# Patient Record
Sex: MALE | Race: WHITE | Employment: FULL TIME | URBAN - METROPOLITAN AREA
[De-identification: names, ages, dates, MRNs, and addresses within clinical notes are randomized per-mention and may not be internally consistent; named-entity substitution may affect disease eponyms.]

---

## 2017-12-01 PROBLEM — R35.1 NOCTURIA: Status: ACTIVE | Noted: 2017-12-01

## 2020-11-30 ENCOUNTER — HOSPITAL ENCOUNTER (OUTPATIENT)
Dept: PREADMISSION TESTING | Age: 85
Discharge: HOME | End: 2020-11-30
Payer: COMMERCIAL

## 2020-11-30 ENCOUNTER — TRANSCRIBE ORDER (OUTPATIENT)
Dept: REGISTRATION | Age: 85
End: 2020-11-30

## 2020-11-30 DIAGNOSIS — D49.4 BLADDER NEOPLASM: ICD-10-CM

## 2020-11-30 DIAGNOSIS — D49.4 BLADDER NEOPLASM: Primary | ICD-10-CM

## 2020-11-30 LAB
ANION GAP SERPL CALC-SCNC: 5 MMOL/L (ref 3–18)
BASOPHILS # BLD: 0 K/UL (ref 0–0.1)
BASOPHILS NFR BLD: 0 % (ref 0–2)
BUN SERPL-MCNC: 17 MG/DL (ref 7–18)
BUN/CREAT SERPL: 13 (ref 12–20)
CALCIUM SERPL-MCNC: 9.7 MG/DL (ref 8.5–10.1)
CHLORIDE SERPL-SCNC: 103 MMOL/L (ref 100–111)
CO2 SERPL-SCNC: 30 MMOL/L (ref 21–32)
CREAT SERPL-MCNC: 1.32 MG/DL (ref 0.6–1.3)
DIFFERENTIAL METHOD BLD: ABNORMAL
EOSINOPHIL # BLD: 0.1 K/UL (ref 0–0.4)
EOSINOPHIL NFR BLD: 1 % (ref 0–5)
ERYTHROCYTE [DISTWIDTH] IN BLOOD BY AUTOMATED COUNT: 13 % (ref 11.6–14.5)
GLUCOSE SERPL-MCNC: 93 MG/DL (ref 74–99)
HCT VFR BLD AUTO: 43.6 % (ref 36–48)
HGB BLD-MCNC: 14.7 G/DL (ref 13–16)
LYMPHOCYTES # BLD: 2.2 K/UL (ref 0.9–3.6)
LYMPHOCYTES NFR BLD: 26 % (ref 21–52)
MCH RBC QN AUTO: 33 PG (ref 24–34)
MCHC RBC AUTO-ENTMCNC: 33.7 G/DL (ref 31–37)
MCV RBC AUTO: 98 FL (ref 74–97)
MONOCYTES # BLD: 0.8 K/UL (ref 0.05–1.2)
MONOCYTES NFR BLD: 10 % (ref 3–10)
NEUTS SEG # BLD: 5.3 K/UL (ref 1.8–8)
NEUTS SEG NFR BLD: 63 % (ref 40–73)
PLATELET # BLD AUTO: 192 K/UL (ref 135–420)
PMV BLD AUTO: 10.9 FL (ref 9.2–11.8)
POTASSIUM SERPL-SCNC: 4.5 MMOL/L (ref 3.5–5.5)
RBC # BLD AUTO: 4.45 M/UL (ref 4.7–5.5)
SODIUM SERPL-SCNC: 138 MMOL/L (ref 136–145)
WBC # BLD AUTO: 8.4 K/UL (ref 4.6–13.2)

## 2020-11-30 PROCEDURE — 36415 COLL VENOUS BLD VENIPUNCTURE: CPT

## 2020-11-30 PROCEDURE — 85025 COMPLETE CBC W/AUTO DIFF WBC: CPT

## 2020-11-30 PROCEDURE — 80048 BASIC METABOLIC PNL TOTAL CA: CPT

## 2020-11-30 PROCEDURE — 93005 ELECTROCARDIOGRAM TRACING: CPT

## 2020-12-01 LAB
ATRIAL RATE: 58 BPM
CALCULATED P AXIS, ECG09: 42 DEGREES
CALCULATED R AXIS, ECG10: 68 DEGREES
CALCULATED T AXIS, ECG11: 55 DEGREES
DIAGNOSIS, 93000: NORMAL
P-R INTERVAL, ECG05: 192 MS
Q-T INTERVAL, ECG07: 402 MS
QRS DURATION, ECG06: 90 MS
QTC CALCULATION (BEZET), ECG08: 394 MS
VENTRICULAR RATE, ECG03: 58 BPM

## 2020-12-10 ENCOUNTER — HOSPITAL ENCOUNTER (OUTPATIENT)
Dept: PREADMISSION TESTING | Age: 85
Discharge: HOME | End: 2020-12-10
Payer: COMMERCIAL

## 2020-12-10 PROCEDURE — 87635 SARS-COV-2 COVID-19 AMP PRB: CPT

## 2020-12-11 LAB — SARS-COV-2, COV2NT: NOT DETECTED

## 2020-12-15 ENCOUNTER — ANESTHESIA (OUTPATIENT)
Dept: SURGERY | Age: 85
End: 2020-12-15
Payer: COMMERCIAL

## 2020-12-15 ENCOUNTER — ANESTHESIA EVENT (OUTPATIENT)
Dept: SURGERY | Age: 85
End: 2020-12-15
Payer: COMMERCIAL

## 2020-12-15 ENCOUNTER — HOSPITAL ENCOUNTER (OUTPATIENT)
Age: 85
Setting detail: OUTPATIENT SURGERY
Discharge: HOME | End: 2020-12-15
Attending: UROLOGY | Admitting: UROLOGY
Payer: COMMERCIAL

## 2020-12-15 VITALS
SYSTOLIC BLOOD PRESSURE: 134 MMHG | WEIGHT: 200.19 LBS | RESPIRATION RATE: 18 BRPM | TEMPERATURE: 97.3 F | HEART RATE: 61 BPM | OXYGEN SATURATION: 100 % | DIASTOLIC BLOOD PRESSURE: 65 MMHG | HEIGHT: 71 IN | BODY MASS INDEX: 28.02 KG/M2

## 2020-12-15 DIAGNOSIS — N32.89 BLADDER MASS: Primary | ICD-10-CM

## 2020-12-15 PROCEDURE — 77030034696 HC CATH URETH FOL 2W BARD -A: Performed by: UROLOGY

## 2020-12-15 PROCEDURE — 74011250636 HC RX REV CODE- 250/636: Performed by: NURSE ANESTHETIST, CERTIFIED REGISTERED

## 2020-12-15 PROCEDURE — 2709999900 HC NON-CHARGEABLE SUPPLY: Performed by: UROLOGY

## 2020-12-15 PROCEDURE — 76060000032 HC ANESTHESIA 0.5 TO 1 HR: Performed by: UROLOGY

## 2020-12-15 PROCEDURE — 74011000250 HC RX REV CODE- 250: Performed by: UROLOGY

## 2020-12-15 PROCEDURE — 74011250636 HC RX REV CODE- 250/636: Performed by: UROLOGY

## 2020-12-15 PROCEDURE — 77030040361 HC SLV COMPR DVT MDII -B: Performed by: UROLOGY

## 2020-12-15 PROCEDURE — 76210000026 HC REC RM PH II 1 TO 1.5 HR: Performed by: UROLOGY

## 2020-12-15 PROCEDURE — 76210000063 HC OR PH I REC FIRST 0.5 HR: Performed by: UROLOGY

## 2020-12-15 PROCEDURE — 77030008683 HC TU ET CUF COVD -A: Performed by: STUDENT IN AN ORGANIZED HEALTH CARE EDUCATION/TRAINING PROGRAM

## 2020-12-15 PROCEDURE — 74011000250 HC RX REV CODE- 250: Performed by: NURSE ANESTHETIST, CERTIFIED REGISTERED

## 2020-12-15 PROCEDURE — 77030020268 HC MISC GENERAL SUPPLY: Performed by: UROLOGY

## 2020-12-15 PROCEDURE — 88305 TISSUE EXAM BY PATHOLOGIST: CPT

## 2020-12-15 PROCEDURE — 88307 TISSUE EXAM BY PATHOLOGIST: CPT

## 2020-12-15 PROCEDURE — 77030020782 HC GWN BAIR PAWS FLX 3M -B: Performed by: UROLOGY

## 2020-12-15 PROCEDURE — 76010000138 HC OR TIME 0.5 TO 1 HR: Performed by: UROLOGY

## 2020-12-15 PROCEDURE — 77030008477 HC STYL SATN SLP COVD -A: Performed by: STUDENT IN AN ORGANIZED HEALTH CARE EDUCATION/TRAINING PROGRAM

## 2020-12-15 PROCEDURE — 77030013079 HC BLNKT BAIR HGGR 3M -A: Performed by: STUDENT IN AN ORGANIZED HEALTH CARE EDUCATION/TRAINING PROGRAM

## 2020-12-15 RX ORDER — NEOSTIGMINE METHYLSULFATE 1 MG/ML
INJECTION, SOLUTION INTRAVENOUS AS NEEDED
Status: DISCONTINUED | OUTPATIENT
Start: 2020-12-15 | End: 2020-12-15 | Stop reason: HOSPADM

## 2020-12-15 RX ORDER — SODIUM CHLORIDE, SODIUM LACTATE, POTASSIUM CHLORIDE, CALCIUM CHLORIDE 600; 310; 30; 20 MG/100ML; MG/100ML; MG/100ML; MG/100ML
125 INJECTION, SOLUTION INTRAVENOUS CONTINUOUS
Status: DISCONTINUED | OUTPATIENT
Start: 2020-12-15 | End: 2020-12-15 | Stop reason: HOSPADM

## 2020-12-15 RX ORDER — TRAMADOL HYDROCHLORIDE 50 MG/1
50 TABLET ORAL
Qty: 10 TAB | Refills: 0 | Status: SHIPPED | OUTPATIENT
Start: 2020-12-15 | End: 2020-12-18

## 2020-12-15 RX ORDER — NALBUPHINE HYDROCHLORIDE 10 MG/ML
5 INJECTION, SOLUTION INTRAMUSCULAR; INTRAVENOUS; SUBCUTANEOUS
Status: DISCONTINUED | OUTPATIENT
Start: 2020-12-15 | End: 2020-12-15 | Stop reason: HOSPADM

## 2020-12-15 RX ORDER — SODIUM CHLORIDE 0.9 % (FLUSH) 0.9 %
5-40 SYRINGE (ML) INJECTION EVERY 8 HOURS
Status: DISCONTINUED | OUTPATIENT
Start: 2020-12-15 | End: 2020-12-15 | Stop reason: HOSPADM

## 2020-12-15 RX ORDER — SODIUM CHLORIDE, SODIUM LACTATE, POTASSIUM CHLORIDE, CALCIUM CHLORIDE 600; 310; 30; 20 MG/100ML; MG/100ML; MG/100ML; MG/100ML
50 INJECTION, SOLUTION INTRAVENOUS CONTINUOUS
Status: DISCONTINUED | OUTPATIENT
Start: 2020-12-15 | End: 2020-12-15 | Stop reason: HOSPADM

## 2020-12-15 RX ORDER — FENTANYL CITRATE 50 UG/ML
INJECTION, SOLUTION INTRAMUSCULAR; INTRAVENOUS AS NEEDED
Status: DISCONTINUED | OUTPATIENT
Start: 2020-12-15 | End: 2020-12-15 | Stop reason: HOSPADM

## 2020-12-15 RX ORDER — LIDOCAINE HYDROCHLORIDE 20 MG/ML
INJECTION, SOLUTION EPIDURAL; INFILTRATION; INTRACAUDAL; PERINEURAL AS NEEDED
Status: DISCONTINUED | OUTPATIENT
Start: 2020-12-15 | End: 2020-12-15 | Stop reason: HOSPADM

## 2020-12-15 RX ORDER — GLYCOPYRROLATE 0.2 MG/ML
INJECTION INTRAMUSCULAR; INTRAVENOUS AS NEEDED
Status: DISCONTINUED | OUTPATIENT
Start: 2020-12-15 | End: 2020-12-15 | Stop reason: HOSPADM

## 2020-12-15 RX ORDER — PROPOFOL 10 MG/ML
INJECTION, EMULSION INTRAVENOUS AS NEEDED
Status: DISCONTINUED | OUTPATIENT
Start: 2020-12-15 | End: 2020-12-15 | Stop reason: HOSPADM

## 2020-12-15 RX ORDER — NALOXONE HYDROCHLORIDE 0.4 MG/ML
0.04 INJECTION, SOLUTION INTRAMUSCULAR; INTRAVENOUS; SUBCUTANEOUS
Status: DISCONTINUED | OUTPATIENT
Start: 2020-12-15 | End: 2020-12-15 | Stop reason: HOSPADM

## 2020-12-15 RX ORDER — MIDAZOLAM HYDROCHLORIDE 1 MG/ML
INJECTION, SOLUTION INTRAMUSCULAR; INTRAVENOUS AS NEEDED
Status: DISCONTINUED | OUTPATIENT
Start: 2020-12-15 | End: 2020-12-15 | Stop reason: HOSPADM

## 2020-12-15 RX ORDER — HYDROMORPHONE HYDROCHLORIDE 2 MG/ML
0.2 INJECTION, SOLUTION INTRAMUSCULAR; INTRAVENOUS; SUBCUTANEOUS AS NEEDED
Status: DISCONTINUED | OUTPATIENT
Start: 2020-12-15 | End: 2020-12-15 | Stop reason: HOSPADM

## 2020-12-15 RX ORDER — CEPHALEXIN 500 MG/1
500 CAPSULE ORAL 2 TIMES DAILY
Qty: 10 CAP | Refills: 0 | Status: SHIPPED | OUTPATIENT
Start: 2020-12-15 | End: 2020-12-20

## 2020-12-15 RX ORDER — FENTANYL CITRATE 50 UG/ML
25 INJECTION, SOLUTION INTRAMUSCULAR; INTRAVENOUS
Status: DISCONTINUED | OUTPATIENT
Start: 2020-12-15 | End: 2020-12-15 | Stop reason: HOSPADM

## 2020-12-15 RX ORDER — DEXAMETHASONE SODIUM PHOSPHATE 4 MG/ML
INJECTION, SOLUTION INTRA-ARTICULAR; INTRALESIONAL; INTRAMUSCULAR; INTRAVENOUS; SOFT TISSUE AS NEEDED
Status: DISCONTINUED | OUTPATIENT
Start: 2020-12-15 | End: 2020-12-15 | Stop reason: HOSPADM

## 2020-12-15 RX ORDER — ONDANSETRON 2 MG/ML
INJECTION INTRAMUSCULAR; INTRAVENOUS AS NEEDED
Status: DISCONTINUED | OUTPATIENT
Start: 2020-12-15 | End: 2020-12-15 | Stop reason: HOSPADM

## 2020-12-15 RX ORDER — CEFAZOLIN SODIUM/WATER 2 G/20 ML
2 SYRINGE (ML) INTRAVENOUS ONCE
Status: COMPLETED | OUTPATIENT
Start: 2020-12-15 | End: 2020-12-15

## 2020-12-15 RX ORDER — SODIUM CHLORIDE 0.9 % (FLUSH) 0.9 %
5-40 SYRINGE (ML) INJECTION AS NEEDED
Status: DISCONTINUED | OUTPATIENT
Start: 2020-12-15 | End: 2020-12-15 | Stop reason: HOSPADM

## 2020-12-15 RX ORDER — DIPHENHYDRAMINE HYDROCHLORIDE 50 MG/ML
12.5 INJECTION, SOLUTION INTRAMUSCULAR; INTRAVENOUS
Status: DISCONTINUED | OUTPATIENT
Start: 2020-12-15 | End: 2020-12-15 | Stop reason: HOSPADM

## 2020-12-15 RX ADMIN — Medication 3 MG: at 08:11

## 2020-12-15 RX ADMIN — SODIUM CHLORIDE, SODIUM LACTATE, POTASSIUM CHLORIDE, AND CALCIUM CHLORIDE 125 ML/HR: 600; 310; 30; 20 INJECTION, SOLUTION INTRAVENOUS at 06:17

## 2020-12-15 RX ADMIN — ONDANSETRON HYDROCHLORIDE 4 MG: 2 INJECTION INTRAMUSCULAR; INTRAVENOUS at 08:02

## 2020-12-15 RX ADMIN — FENTANYL CITRATE 50 MCG: 50 INJECTION, SOLUTION INTRAMUSCULAR; INTRAVENOUS at 07:34

## 2020-12-15 RX ADMIN — MITOMYCIN 40 MG: 20 INJECTION, POWDER, LYOPHILIZED, FOR SOLUTION INTRAVENOUS at 08:16

## 2020-12-15 RX ADMIN — FENTANYL CITRATE 25 MCG: 50 INJECTION, SOLUTION INTRAMUSCULAR; INTRAVENOUS at 08:02

## 2020-12-15 RX ADMIN — DEXAMETHASONE SODIUM PHOSPHATE 4 MG: 4 INJECTION, SOLUTION INTRAMUSCULAR; INTRAVENOUS at 07:55

## 2020-12-15 RX ADMIN — LIDOCAINE HYDROCHLORIDE 100 MG: 20 INJECTION, SOLUTION EPIDURAL; INFILTRATION; INTRACAUDAL; PERINEURAL at 07:34

## 2020-12-15 RX ADMIN — Medication 2 G: at 07:29

## 2020-12-15 RX ADMIN — PROPOFOL 140 MG: 10 INJECTION, EMULSION INTRAVENOUS at 07:37

## 2020-12-15 RX ADMIN — SODIUM CHLORIDE, SODIUM LACTATE, POTASSIUM CHLORIDE, AND CALCIUM CHLORIDE 125 ML/HR: 600; 310; 30; 20 INJECTION, SOLUTION INTRAVENOUS at 09:03

## 2020-12-15 RX ADMIN — FENTANYL CITRATE 25 MCG: 50 INJECTION, SOLUTION INTRAMUSCULAR; INTRAVENOUS at 07:52

## 2020-12-15 RX ADMIN — GLYCOPYRROLATE 0.6 MG: 0.2 INJECTION INTRAMUSCULAR; INTRAVENOUS at 08:11

## 2020-12-15 RX ADMIN — MIDAZOLAM 2 MG: 1 INJECTION INTRAMUSCULAR; INTRAVENOUS at 07:27

## 2020-12-15 NOTE — OP NOTES
[address] REPORT    Name:  David Snyder  MR#:   [id number]  :  1935  ACCOUNT #:  [de-identified]  DATE OF SERVICE:  12/15/2020    PREOPERATIVE DIAGNOSIS:  Neoplasm of unspecified behavior of bladder. POSTOPERATIVE DIAGNOSIS:  Neoplasm of unspecified behavior of bladder. PROCEDURES PERFORMED:  Cystoscopy, transurethral resection of bladder tumor median with intravascular instillation of mitomycin. SURGEON:  Yobani Powell MD    ASSISTANT:  None. ANESTHESIA:  General.    COMPLICATIONS:  None. SPECIMENS REMOVED:  Bladder tumor. IMPLANTS:  None. DRAIN:  A 22-Swazi coude-tipped Baird. ESTIMATED BLOOD LOSS:  Minimal.    FINDINGS:  The patient had a 4-cm tumor covering the anterior cervix of the bladder neck from 8 o'clock through 2 o'clock position and extending towards the urethral sphincter quite significantly. There was mass all along the trigone up the right side wall, but the UO was spared on both the left and right sides. At the end of the case, everything was resected completely, and there was no visible tumor. CLINICAL NOTE:  The patient is an 20-year-old gentleman with a history of prostate cancer, status post radical prostatectomy who has gross hematuria. He presents for TURBT after a bladder tumor was discovered. PROCEDURE:  Preoperatively, risks and benefits of the surgery were described to the patient. The risks included, but were not limited to bleeding, infection, injury to the bladder, injury to the ureter, injury to the kidney and possible need for future procedures to be made tumor free. The patient understood the risks and signed the informed consent. The patient was taken to the operating room and placed on the OR table in supine position. He was administered general anesthetic. He was administered intravenous antibiotics. He was placed in dorsal lithotomy position and prepped and draped in the usual sterile manner.   A 25-Swazi resectoscope and sheath were then inserted transurethrally atraumatically under direct vision using a 30-degree lens and visual obturator. Panendoscopy was done. Bilateral ureteral orifices were in normal anatomic position. The prostate was absent. There were no stones within the bladder. There were grade I to II bladder trabeculations. There was noted to be a papillary tumor extending from 8 o'clock at the bladder neck all along the anterior surface to 2 o'clock on the other side. The tumor extended over the middle of the trigone as well and up the right side, but the papillary fronds and the UO was spared. The remainder of the bladder appeared normal.  Using a bipolar loop resectoscope, the tumor was resected in its entirety. Unfortunately to get the tumor out, a resection had to be done very close to the urethral sphincter. I took care not to do much cautery in the area near the sphincter, but the rest of the area was cauterized. Both ureteral orifices were spared. Using an [employer], I got rid of all of the bladder tumor pieces and I reinspected and there were no significant pieces left behind. There was no tumor. I re-cauterized all the areas except for the areas immediately adjacent to the sphincter muscle. At this point, there was no evidence of perforation or injury, and the UOs were normal and intact. I removed the scope. I then inserted a 22-Icelandic coude-tipped Baird catheter without any difficulty whatsoever. The balloon is inflated and the bladder emptied. A 40 mg mitomycin was then instilled into the bladder and it was capped. The patient was then taken out of lithotomy position, revived from anesthesia, and taken to Recovery in stable condition.       Vaishali Corbett MD      /[id number]  D:  12/15/2020 8:37  T:  12/15/2020 17:09  JOB #:  [id number]

## 2020-12-15 NOTE — ANESTHESIA PREPROCEDURE EVALUATION
Relevant Problems   No relevant active problems       Anesthetic History   No history of anesthetic complications     Pertinent negatives: No PONV       Review of Systems / Medical History  Patient summary reviewed, nursing notes reviewed and pertinent labs reviewed    Pulmonary  Within defined limits            Pertinent negatives: No COPD, asthma and sleep apnea     Neuro/Psych   Within defined limits        Pertinent negatives: No seizures and CVA   Cardiovascular    Hypertension          Past MI, CAD and cardiac stents  Pertinent negatives: No CHF       GI/Hepatic/Renal     GERD        Pertinent negatives: No liver disease and renal disease   Endo/Other  Within defined limits        Pertinent negatives: No diabetes   Other Findings              Physical Exam    Airway  Mallampati: I  TM Distance: 4 - 6 cm  Neck ROM: normal range of motion   Mouth opening: Normal     Cardiovascular    Rhythm: regular  Rate: normal         Dental    Dentition: Poor dentition     Pulmonary  Breath sounds clear to auscultation               Abdominal  GI exam deferred       Other Findings            Anesthetic Plan    ASA: 3  Anesthesia type: general          Induction: Intravenous  Anesthetic plan and risks discussed with: Patient

## 2020-12-15 NOTE — BRIEF OP NOTE
Brief Postoperative Note    Patient: Margareth Manzanares  YOB: 1935  MRN: [id number]    Date of Procedure: 12/15/2020     Pre-Op Diagnosis: NEOPLASM UNSPECIFIED BEHAVIOR BLADDER    Post-Op Diagnosis: Same as preoperative diagnosis. Procedure(s):  CYSTOSCOPY, TRANSURETHRAL RESECTION BLADDER TUMOR (MEDIUM) WITH INTRAVESICAL INSTILLATION OF MITOMYCIN    Surgeon(s):  Lucy Washington MD    Surgical Assistant: nONE    Anesthesia: General     Estimated Blood Loss (mL): Minimal    Complications: None    Specimens:   ID Type Source Tests Collected by Time Destination   1 : BLADDER TUMOR Preservative Bladder  Lucy Washington MD 12/15/2020 0818 Pathology        Implants: * No implants in log *    Drains: 25 FR COUDE TIP MADISON    Findings: 4 CM TUMOR COVERING ANTERIOR SURFACE OF BLADDER NECK FROM [address] 2 OCLOCK.   MASS ALONG THE TRIGONE UP THE RIGHT SIDE WALL - THE UO WAS SPARED [facility]    Electronically Signed by Misha Celestin MD on 12/15/2020 at 8:30 AM

## 2020-12-15 NOTE — PERIOP NOTES
Oliver cath unclamped as ordered - Mitomycin released into large gravity bag - oliver changed to leg bag for home use - Chemo precautions per protocol - urine emptied into hopper - diluted with 1 cup of Bleach - covered and double flushed - pt tolerated procedure without difficulty

## 2020-12-15 NOTE — DISCHARGE INSTRUCTIONS
Resume pre hospital diet  Drink plenty of fluids to keep the urine clear  Take prescriptions as directed  Ambulate in house  Avoid straining, avoid heavy lifting  Keep catheter leg bag emptied  Dr. Edgardo Gee office will call with follow up appointment    For the next 48 hours:    Empty urine into toilet - dilute with 1 cup of BLEACH, cover and double flush  Clean urine spills on self with soap and water, clean urine spills on toilet or floor with Bleach wipes  Use separate bathroom from others in house  Wash clothes separately        DISCHARGE SUMMARY from Nurse    PATIENT INSTRUCTIONS:    After general anesthesia or intravenous sedation, for 24 hours or while taking prescription Narcotics:  · Limit your activities  · Do not drive and operate hazardous machinery  · Do not make important personal or business decisions  · Do  not drink alcoholic beverages  · If you have not urinated within 8 hours after discharge, please contact your surgeon on call. Report the following to your surgeon:  · Excessive pain, swelling, redness or odor of or around the surgical area  · Temperature over 100.5  · Nausea and vomiting lasting longer than 4 hours or if unable to take medications  · Any signs of decreased circulation or nerve impairment to extremity: change in color, persistent  numbness, tingling, coldness or increase pain  · Any questions    What to do at Home:  Recommended activity: Ambulate in house, No driving while on analgesics and No heavy lifting until advised     If you experience any of the following symptoms fever, chills, uncontrollable pain , active bleeding ( thick bloody urine ), nausea, vomiting, please follow up with Dr. Viraj Lou. *  Please give a list of your current medications to your Primary Care Provider. *  Please update this list whenever your medications are discontinued, doses are      changed, or new medications (including over-the-counter products) are added.     *  Please carry medication information at all times in case of emergency situations. These are general instructions for a healthy lifestyle:    No smoking/ No tobacco products/ Avoid exposure to second hand smoke  Surgeon General's Warning:  Quitting smoking now greatly reduces serious risk to your health. Obesity, smoking, and sedentary lifestyle greatly increases your risk for illness    A healthy diet, regular physical exercise & weight monitoring are important for maintaining a healthy lifestyle    You may be retaining fluid if you have a history of heart failure or if you experience any of the following symptoms:  Weight gain of 3 pounds or more overnight or 5 pounds in a week, increased swelling in our hands or feet or shortness of breath while lying flat in bed. Please call your doctor as soon as you notice any of these symptoms; do not wait until your next office visit. Patient armband removed and shredded    The discharge information has been reviewed with the patient and caregiver. The patient and caregiver verbalized understanding. Discharge medications reviewed with the patient and caregiver and appropriate educational materials and side effects teaching were provided. Learning About Coronavirus [phone])  Coronavirus [phone]): Overview  What is coronavirus (COVID-19)? The coronavirus disease (COVID-19) is caused by a virus. It is an illness that was first found in December 2019. It has since spread worldwide. The virus can cause fever, cough, and trouble breathing. In severe cases, it can cause pneumonia and make it hard to breathe without help. It can cause death. This virus spreads person-to-person through droplets from coughing and sneezing. It can also spread when you are close to someone who is infected. And it can spread when you touch something that has the virus on it, such as a doorknob or a tabletop. Coronaviruses are a large group of viruses. They cause the common cold.  They also cause more serious illnesses like Middle East respiratory syndrome (MERS) and severe acute respiratory syndrome (SARS). COVID-19 is caused by a novel coronavirus. That means it's a new type that has not been seen in people before. How is COVID-19 treated? Mild illness can be treated at home, but more serious illness needs to be treated in the hospital. Treatment may include medicines to reduce symptoms, plus breathing support such as oxygen therapy or a ventilator. Other treatments, such as antiviral medicines, may help people who have COVID-19. What can you do to protect yourself from COVID-19? The best way to protect yourself from getting sick is to:  · Avoid areas where there is an outbreak. · Avoid contact with people who may be infected. · Avoid crowds and try to stay at least 6 feet away from other people. · Wash your hands often, especially after you cough or sneeze. Use soap and water, and scrub for at least 20 seconds. If soap and water aren't available, use an alcohol-based hand . · Avoid touching your mouth, nose, and eyes. What can you do to avoid spreading the virus to others? To help avoid spreading the virus to others:  · Wash your hands often with soap or alcohol-based hand sanitizers. · Cover your mouth with a tissue when you cough or sneeze. Then throw the tissue in the trash. · Use a disinfectant to clean things that you touch often. These include doorknobs, remote controls, phones, and handles on your refrigerator and microwave. And don't forget countertops, tabletops, bathrooms, and computer keyboards. · Wear a cloth face cover if you have to go to public areas. If you know or suspect that you have COVID-19:  · Stay home. Don't go to school, work, or public areas. And don't use public transportation, ride-shares, or taxis unless you have no choice. · Leave your home only if you need to get medical care or testing. But call the doctor's office first so they know you're coming.  And wear a face cover. · Limit contact with people in your home. If possible, stay in a separate bedroom and use a separate bathroom. · Wear a face cover whenever you're around other people. It can help stop the spread of the virus when you cough or sneeze. · Clean and disinfect your home every day. Use household  and disinfectant wipes or sprays. Take special care to clean things that you grab with your hands. · Self-isolate until it's safe to be around others again. ? If you have symptoms, it's safe when you haven't had a fever for 3 days and your symptoms have improved and it's been at least 10 days since your symptoms started. ? If you were exposed to the virus but don't have symptoms, it's safe to be around others 14 days after exposure. ? Talk to your doctor about whether you also need testing, especially if you have a weakened immune system. When to call for help  Call 911 anytime you think you may need emergency care. For example, call if:  · You have severe trouble breathing. (You can't talk at all.)  · You have constant chest pain or pressure. · You are severely dizzy or lightheaded. · You are confused or can't think clearly. · Your face and lips have a blue color. · You passed out (lost consciousness) or are very hard to wake up. Call your doctor now if you develop symptoms such as:  · Shortness of breath. · Fever. · Cough. If you need to get care, call ahead to the doctor's office for instructions before you go. Make sure you wear a face cover to prevent exposing other people to the virus. Where can you get the latest information? The following health organizations are tracking and studying this virus. Their websites contain the most up-to-date information. Dave Needles also learn what to do if you think you may have been exposed to the virus. · U.S. Centers for Disease Control and Prevention (CDC): The CDC provides updated news about the disease and travel advice.  The website also tells you how to prevent the spread of infection. www.cdc.gov  · World Health Organization [facility]): WHO offers information about the virus outbreaks. WHO also has travel advice. www.who.int  Current as of: July 10, 2020               Content Version: 12.6  © 2006-2020 [employer]. Care instructions adapted under license by [employer] (which disclaims liability or warranty for this information). If you have questions about a medical condition or this instruction, always ask your healthcare professional. [address] any warranty or liability for your use of this information.     ___________________________________________________________________________________________________________________________________

## 2020-12-15 NOTE — ANESTHESIA POSTPROCEDURE EVALUATION
Post-Anesthesia Evaluation and Assessment    Cardiovascular Function/Vital Signs  Visit Vitals  /76   Pulse 70   Temp 36.4 °C (97.5 °F)   Resp 18   Ht 5' 11\" (1.803 m)   Wt 90.8 kg (200 lb 3 oz)   SpO2 95%   BMI 27.92 kg/m²       Patient is status post Procedure(s):  CYSTOSCOPY, TRANSURETHRAL RESECTION BLADDER TUMOR, MEDIUM WITH MITOMYCIN, \"SPEC POP\". Nausea/Vomiting: Controlled. Postoperative hydration reviewed and adequate. Pain:  Pain Scale 1: Numeric (0 - 10) (12/15/20 0839)  Pain Intensity 1: 3 (12/15/20 0839)   Managed. Neurological Status:   Neuro (WDL): Within Defined Limits (12/15/20 0532)   At baseline. Mental Status and Level of Consciousness: Baseline and appropriate for discharge. Pulmonary Status:   O2 Device: Room air (12/15/20 0839)   Adequate oxygenation and airway patent. Complications related to anesthesia: None    Post-anesthesia assessment completed. No concerns. Patient has met all discharge requirements.     Signed By: Mary Cristina MD    December 15, 2020

## 2020-12-15 NOTE — PERIOP NOTES
Discharge instructions completed - opportunity for questions - instructed pt and wife on oliver care - verbalizes understanding

## 2021-03-31 PROBLEM — I10 ESSENTIAL HYPERTENSION: Status: ACTIVE | Noted: 2018-06-15

## 2021-03-31 PROBLEM — C67.9 MALIGNANT NEOPLASM OF URINARY BLADDER (HCC): Status: ACTIVE | Noted: 2021-03-15

## 2021-08-09 ENCOUNTER — TRANSCRIBE ORDER (OUTPATIENT)
Dept: REGISTRATION | Age: 86
End: 2021-08-09

## 2021-08-09 ENCOUNTER — HOSPITAL ENCOUNTER (OUTPATIENT)
Dept: PREADMISSION TESTING | Age: 86
Discharge: HOME | End: 2021-08-09
Payer: COMMERCIAL

## 2021-08-09 DIAGNOSIS — M16.12 PRIMARY OSTEOARTHRITIS OF LEFT HIP: Primary | ICD-10-CM

## 2021-08-09 DIAGNOSIS — M16.12 PRIMARY OSTEOARTHRITIS OF LEFT HIP: ICD-10-CM

## 2021-08-09 LAB
APPEARANCE UR: CLEAR
APTT PPP: 33.9 SEC (ref 23–36.4)
BILIRUB UR QL: NEGATIVE
COLOR UR: YELLOW
ERYTHROCYTE [SEDIMENTATION RATE] IN BLOOD: 25 MM/HR (ref 0–20)
EST. AVERAGE GLUCOSE BLD GHB EST-MCNC: 111 MG/DL
GLUCOSE UR STRIP.AUTO-MCNC: NEGATIVE MG/DL
HBA1C MFR BLD: 5.5 % (ref 4.2–5.6)
HGB UR QL STRIP: NEGATIVE
INR PPP: 1 (ref 0.8–1.2)
KETONES UR QL STRIP.AUTO: NEGATIVE MG/DL
LEUKOCYTE ESTERASE UR QL STRIP.AUTO: NEGATIVE
NITRITE UR QL STRIP.AUTO: NEGATIVE
PH UR STRIP: 5.5 [PH] (ref 5–8)
PROT UR STRIP-MCNC: NEGATIVE MG/DL
PROTHROMBIN TIME: 12.9 SEC (ref 11.5–15.2)
SP GR UR REFRACTOMETRY: 1.02 (ref 1–1.03)
UROBILINOGEN UR QL STRIP.AUTO: 1 EU/DL (ref 0.2–1)

## 2021-08-09 PROCEDURE — 81003 URINALYSIS AUTO W/O SCOPE: CPT

## 2021-08-09 PROCEDURE — 83036 HEMOGLOBIN GLYCOSYLATED A1C: CPT

## 2021-08-09 PROCEDURE — 36415 COLL VENOUS BLD VENIPUNCTURE: CPT

## 2021-08-09 PROCEDURE — 85610 PROTHROMBIN TIME: CPT

## 2021-08-09 PROCEDURE — 85652 RBC SED RATE AUTOMATED: CPT

## 2021-08-09 PROCEDURE — 85730 THROMBOPLASTIN TIME PARTIAL: CPT

## 2021-08-09 PROCEDURE — 87086 URINE CULTURE/COLONY COUNT: CPT

## 2021-08-10 LAB
BACTERIA SPEC CULT: NORMAL
SERVICE CMNT-IMP: NORMAL
SERVICE CMNT-IMP: NORMAL

## 2021-08-20 ENCOUNTER — HOSPITAL ENCOUNTER (OUTPATIENT)
Dept: PREADMISSION TESTING | Age: 86
Discharge: HOME | End: 2021-08-20
Payer: COMMERCIAL

## 2021-08-20 PROCEDURE — U0003 INFECTIOUS AGENT DETECTION BY NUCLEIC ACID (DNA OR RNA); SEVERE ACUTE RESPIRATORY SYNDROME CORONAVIRUS 2 (SARS-COV-2) (CORONAVIRUS DISEASE [COVID-19]), AMPLIFIED PROBE TECHNIQUE, MAKING USE OF HIGH THROUGHPUT TECHNOLOGIES AS DESCRIBED BY CMS-2020-01-R: HCPCS

## 2021-08-20 NOTE — H&P
[address] [facility] [address] Medicine  History and Physical Exam    Patient: aT Durán MRN: [id number]  SSN: [id number]    YOB: 1935  Age: 80 y.o. Sex: male      Subjective:      Chief Complaint: left hip pain    History of Present Illness:  Patient complains of hip pain on the affected side and difficulty ambulating. Pain is increased with increasing activity. Pain is increased with weight bearing. Pain interferes with activities of daily living. Patient has noticed decreased range of motion and difficulty putting on shoes and socks. Past Medical History:   Diagnosis Date    Arthritis     hip    Bladder cancer ([facility] [address] 2021    BPH (benign prostatic hyperplasia)     Burning with urination     CAD (coronary artery disease)     Difficulty urinating     GERD (gastroesophageal reflux disease)     Heart abnormalities 06/01/2012     2 STENTS RIGHT BARTERY 100% BLOCKAGE     History of prostate cancer     Hyperlipidemia     Hypertension 2016    Incontinence     Nocturia     Prostate CA ([facility] [address] 2009    Screening for prostate cancer     Stress incontinence of urine     Urge incontinence of urine      Past Surgical History:   Procedure Laterality Date    EYE SERVICE OR PROCEDURE  1/2012    REMOVAL OF GROWTH ON LT EYE    HX CATARACT REMOVAL  2010    BILAT    HX HIP REPLACEMENT  7/2006    RT HIP    HX KNEE REPLACEMENT  11/2011    LT KNEE    HX MOHS PROCEDURES  1992    RT SHOULDER    HX ORTHOPAEDIC      right total hip replacement    HX ORTHOPAEDIC  2019    right shoulder reconstruction    HX PROSTATECTOMY  11/2009    DVP-DR Radha Godinez    HX UROLOGICAL  03/2012    Advance Male Sling    HX UROLOGICAL  12/15/2020    Cysto, TURBT, instillation of mitomycin. Dr. Tari Valencia.  HX UROLOGICAL  03/16/2012    Advance sling placement, cysto. Dr. Lucy Lei, [facility].  HX UROLOGICAL  11/16/2009    Robotic assisted radical prostatectomy, without bilateral nerve sparing.   [place]    HX UROLOGICAL  05/07/2006    prostate bx. path benign. Dr. Shara Felty HX UROLOGICAL  05/13/2005    prostate bx. path benign. Dr. Shara Felty HX UROLOGICAL  07/18/2000    prostate bx. path benign. Dr. Nannette Musa HX UROLOGICAL      bladder tumors *3    OR CARDIAC SURG PROCEDURE UNLIST  06/01/2012    2 STENTS RIGHT SIDE     OR KNEE SCOPE,CLEAN/DRAIN  1957    OR KNEE SCOPE,CLEAN/DRAIN  1994    OR KNEE SCOPE,CLEAN/DRAIN  1993    RT KNEE     Social History     Occupational History    Not on file   Tobacco Use    Smoking status: Former Smoker    Smokeless tobacco: Never Used   Vaping Use    Vaping Use: Never used   Substance and Sexual Activity    Alcohol use: No    Drug use: No    Sexual activity: Never     Partners: Female     Comment: E.D. SINCE DVP 2009     Prior to Admission medications    Medication Sig Start Date End Date Taking? Authorizing Provider   lutein 40 mg cap Take  by mouth daily. Provider, Historical   CALCIUM-MAGNESIUM-ZINC PO Take  by mouth daily. Provider, Historical   FIBER, CALCIUM POLYCARBOPHIL, PO Take  by mouth daily. Provider, Historical   methylcellulose (FIBER THERAPY) by Does Not Apply route daily. Provider, Historical   ibuprofen (MOTRIN) 800 mg tablet Take  by mouth three (3) times daily. Provider, Historical   Lactobac no.41/Bifidobact no.7 (PROBIOTIC-10 PO) Take  by mouth. Provider, Historical   ascorbic acid, vitamin C, (VITAMIN C) 500 mg tablet Take 500 mg by Mouth Once a Day. Provider, Historical   docusate sodium (COLACE) 100 mg capsule Take 100 mg by mouth daily. 3/9/21   Provider, Historical   vitamin e (E GEMS) 100 unit capsule Take  by mouth daily. Provider, Historical   cyanocobalamin (VITAMIN B12) 500 mcg tablet Take 5,000 mcg by mouth daily. Provider, Historical   Cholecalciferol, Vitamin D3, 3,000 unit tab Take 400 Units by mouth daily.  6/17/13   Provider, Historical   lisinopril (PRINIVIL, ZESTRIL) 10 mg tablet  11/12/17 Provider, Historical   aspirin 81 mg tablet Take 81 mg by mouth. Indications: stent    Provider, Historical   atorvastatin (LIPITOR) 80 mg tablet Take 80 mg by mouth daily. Provider, Historical   multivitamin (ONE A DAY) tablet Take 1 Tab by mouth daily. Provider, Historical   coenzyme q10-vitamin e (COQ10 ) 100-100 mg-unit Cap Take 200 Units by mouth. Provider, Historical   RABEprazole (ACIPHEX) 20 mg tablet Take 20 mg by mouth daily. Provider, Historical   GLUC/TRES-MSM#1/C/RAMIRO/JANET/BOR (OSTEO BI-FLEX PO) Take  by mouth. Provider, Historical     Family History   Problem Relation Age of Onset    Other Mother         [de-identified]    Cancer Father         PROSTATE CA    Other Father         AORTIC ANUERYSM    Colon Cancer Paternal Grandfather     Prostate Cancer Maternal Uncle        Allergies: No Known Allergies     Review of Systems:  A comprehensive review of systems was negative. Objective:       Physical Exam:  General:  Alert, oriented, pleasant, well-groomed  HEENT:  Normocephalic, atraumatic  Lungs:   Clear to auscultation  Heart:    Regular rate and rhythm  Abdomen:  Soft, non-tender  Extremities:   Pain with motion of the the affected hip    Crepitus with motion of the affected hip    Limited range of motion of the affected hip    Mild effusion       Xrays:   Xrays demonstrate severe arthritic changes including sunchondral cysts, subchondral sclerosis, periarticular osteophytes, and joint space narrowing. Assessment:      Arthritis of the affected hip. This has significantly affected the patient's quality of life. It interferes with activities of daily living. It is worse with weight bearing and activity. Plan:       Proceed with scheduled left total hip.     The patient has failed conservative measures including anti-inflammatories, injections, activity modification, and a trial of physical therapy or external joint support which includes a home exercise program.    The various methods of treatment have been discussed with the patient and family. After consideration of risks, benefits, and other options for treatment, the patient has consented to surgical interventions. Questions were answered and preoperative teaching was done by Dr. Oneil Mckeon.      Signed By: Nikky Gonzáles PA-C     August 20, 2021

## 2021-08-21 LAB — SARS-COV-2, COV2NT: NOT DETECTED

## 2021-08-25 ENCOUNTER — HOME HEALTH ADMISSION (OUTPATIENT)
Dept: HOME HEALTH SERVICES | Facility: HOME HEALTH | Age: 86
End: 2021-08-25
Payer: COMMERCIAL

## 2021-08-25 ENCOUNTER — ANESTHESIA EVENT (OUTPATIENT)
Dept: SURGERY | Age: 86
End: 2021-08-25
Payer: COMMERCIAL

## 2021-08-25 ENCOUNTER — HOSPITAL ENCOUNTER (OUTPATIENT)
Age: 86
Discharge: HOME HEALTH | End: 2021-08-25
Attending: ORTHOPAEDIC SURGERY | Admitting: ORTHOPAEDIC SURGERY
Payer: COMMERCIAL

## 2021-08-25 ENCOUNTER — APPOINTMENT (OUTPATIENT)
Dept: GENERAL RADIOLOGY | Age: 86
End: 2021-08-25
Attending: ORTHOPAEDIC SURGERY
Payer: COMMERCIAL

## 2021-08-25 ENCOUNTER — ANESTHESIA (OUTPATIENT)
Dept: SURGERY | Age: 86
End: 2021-08-25
Payer: COMMERCIAL

## 2021-08-25 VITALS
HEART RATE: 65 BPM | DIASTOLIC BLOOD PRESSURE: 52 MMHG | HEIGHT: 70 IN | SYSTOLIC BLOOD PRESSURE: 137 MMHG | BODY MASS INDEX: 28.18 KG/M2 | WEIGHT: 196.8 LBS | OXYGEN SATURATION: 94 % | TEMPERATURE: 98.3 F | RESPIRATION RATE: 20 BRPM

## 2021-08-25 DIAGNOSIS — M16.12 PRIMARY OSTEOARTHRITIS OF LEFT HIP: Primary | ICD-10-CM

## 2021-08-25 PROBLEM — M16.9 HIP OSTEOARTHRITIS: Status: ACTIVE | Noted: 2021-08-25

## 2021-08-25 LAB
ABO + RH BLD: NORMAL
BLOOD GROUP ANTIBODIES SERPL: NORMAL
SPECIMEN EXP DATE BLD: NORMAL

## 2021-08-25 PROCEDURE — 97161 PT EVAL LOW COMPLEX 20 MIN: CPT

## 2021-08-25 PROCEDURE — 97166 OT EVAL MOD COMPLEX 45 MIN: CPT

## 2021-08-25 PROCEDURE — 97530 THERAPEUTIC ACTIVITIES: CPT

## 2021-08-25 PROCEDURE — 77030035643 HC BLD SAW OSC PRECIS STRY -C: Performed by: ORTHOPAEDIC SURGERY

## 2021-08-25 PROCEDURE — 74011250636 HC RX REV CODE- 250/636: Performed by: ORTHOPAEDIC SURGERY

## 2021-08-25 PROCEDURE — 74011000250 HC RX REV CODE- 250: Performed by: NURSE ANESTHETIST, CERTIFIED REGISTERED

## 2021-08-25 PROCEDURE — 77030020782 HC GWN BAIR PAWS FLX 3M -B: Performed by: ORTHOPAEDIC SURGERY

## 2021-08-25 PROCEDURE — 74011000250 HC RX REV CODE- 250: Performed by: PHYSICIAN ASSISTANT

## 2021-08-25 PROCEDURE — 74011250636 HC RX REV CODE- 250/636: Performed by: ANESTHESIOLOGY

## 2021-08-25 PROCEDURE — C1776 JOINT DEVICE (IMPLANTABLE): HCPCS | Performed by: ORTHOPAEDIC SURGERY

## 2021-08-25 PROCEDURE — 74011250636 HC RX REV CODE- 250/636: Performed by: NURSE ANESTHETIST, CERTIFIED REGISTERED

## 2021-08-25 PROCEDURE — 97535 SELF CARE MNGMENT TRAINING: CPT

## 2021-08-25 PROCEDURE — 36415 COLL VENOUS BLD VENIPUNCTURE: CPT

## 2021-08-25 PROCEDURE — 2709999900 HC NON-CHARGEABLE SUPPLY: Performed by: ORTHOPAEDIC SURGERY

## 2021-08-25 PROCEDURE — 76210000016 HC OR PH I REC 1 TO 1.5 HR: Performed by: ORTHOPAEDIC SURGERY

## 2021-08-25 PROCEDURE — 74011250637 HC RX REV CODE- 250/637: Performed by: ORTHOPAEDIC SURGERY

## 2021-08-25 PROCEDURE — 77030008683 HC TU ET CUF COVD -A: Performed by: ANESTHESIOLOGY

## 2021-08-25 PROCEDURE — 76060000034 HC ANESTHESIA 1.5 TO 2 HR: Performed by: ORTHOPAEDIC SURGERY

## 2021-08-25 PROCEDURE — 77030040361 HC SLV COMPR DVT MDII -B: Performed by: ORTHOPAEDIC SURGERY

## 2021-08-25 PROCEDURE — 97116 GAIT TRAINING THERAPY: CPT

## 2021-08-25 PROCEDURE — 77030031139 HC SUT VCRL2 J&J -A: Performed by: ORTHOPAEDIC SURGERY

## 2021-08-25 PROCEDURE — 77030008477 HC STYL SATN SLP COVD -A: Performed by: ANESTHESIOLOGY

## 2021-08-25 PROCEDURE — 77030006643: Performed by: ANESTHESIOLOGY

## 2021-08-25 PROCEDURE — 77030002933 HC SUT MCRYL J&J -A: Performed by: ORTHOPAEDIC SURGERY

## 2021-08-25 PROCEDURE — 76010000153 HC OR TIME 1.5 TO 2 HR: Performed by: ORTHOPAEDIC SURGERY

## 2021-08-25 PROCEDURE — 77030034479 HC ADH SKN CLSR PRINEO J&J -B: Performed by: ORTHOPAEDIC SURGERY

## 2021-08-25 PROCEDURE — 77030027138 HC INCENT SPIROMETER -A: Performed by: ORTHOPAEDIC SURGERY

## 2021-08-25 PROCEDURE — 77030012890

## 2021-08-25 PROCEDURE — 74011000250 HC RX REV CODE- 250: Performed by: ORTHOPAEDIC SURGERY

## 2021-08-25 PROCEDURE — 77030018673: Performed by: ORTHOPAEDIC SURGERY

## 2021-08-25 PROCEDURE — 86901 BLOOD TYPING SEROLOGIC RH(D): CPT

## 2021-08-25 PROCEDURE — 76942 ECHO GUIDE FOR BIOPSY: CPT | Performed by: ORTHOPAEDIC SURGERY

## 2021-08-25 PROCEDURE — 77030003666 HC NDL SPINAL BD -A: Performed by: ORTHOPAEDIC SURGERY

## 2021-08-25 DEVICE — 6.5MM LOW PROFILE HEX SCREW 25MM
Type: IMPLANTABLE DEVICE | Site: HIP | Status: FUNCTIONAL
Brand: TRIDENT II

## 2021-08-25 DEVICE — 132 DEGREE NECK ANGLE V40 HIP STEM - LEFT
Type: IMPLANTABLE DEVICE | Site: HIP | Status: FUNCTIONAL
Brand: CITATION

## 2021-08-25 DEVICE — CERAMIC V40 FEMORAL HEAD
Type: IMPLANTABLE DEVICE | Site: HIP | Status: FUNCTIONAL
Brand: BIOLOX

## 2021-08-25 DEVICE — TRIDENT II CLUSTERHOLE HA ACETABULAR SHELL 56F
Type: IMPLANTABLE DEVICE | Site: HIP | Status: FUNCTIONAL
Brand: TRIDENT II

## 2021-08-25 DEVICE — HIP H2 TOT ADV OTHER HD -- IMPL CAPPED H2: Type: IMPLANTABLE DEVICE | Site: HIP | Status: FUNCTIONAL

## 2021-08-25 DEVICE — TRIDENT X3 0 DEGREE POLYETHYLENE INSERT
Type: IMPLANTABLE DEVICE | Site: HIP | Status: FUNCTIONAL
Brand: TRIDENT X3 INSERT

## 2021-08-25 RX ORDER — HYDROMORPHONE HYDROCHLORIDE 1 MG/ML
0.5 INJECTION, SOLUTION INTRAMUSCULAR; INTRAVENOUS; SUBCUTANEOUS
Status: DISCONTINUED | OUTPATIENT
Start: 2021-08-25 | End: 2021-08-25 | Stop reason: HOSPADM

## 2021-08-25 RX ORDER — SODIUM CHLORIDE, SODIUM LACTATE, POTASSIUM CHLORIDE, CALCIUM CHLORIDE 600; 310; 30; 20 MG/100ML; MG/100ML; MG/100ML; MG/100ML
300 INJECTION, SOLUTION INTRAVENOUS CONTINUOUS
Status: DISCONTINUED | OUTPATIENT
Start: 2021-08-25 | End: 2021-08-25 | Stop reason: HOSPADM

## 2021-08-25 RX ORDER — SODIUM CHLORIDE 0.9 % (FLUSH) 0.9 %
5-40 SYRINGE (ML) INJECTION AS NEEDED
Status: DISCONTINUED | OUTPATIENT
Start: 2021-08-25 | End: 2021-08-25 | Stop reason: HOSPADM

## 2021-08-25 RX ORDER — ASPIRIN 325 MG
325 TABLET, DELAYED RELEASE (ENTERIC COATED) ORAL 2 TIMES DAILY
Qty: 60 TABLET | Refills: 0 | Status: SHIPPED | OUTPATIENT
Start: 2021-08-25 | End: 2022-04-06

## 2021-08-25 RX ORDER — SODIUM CHLORIDE, SODIUM LACTATE, POTASSIUM CHLORIDE, CALCIUM CHLORIDE 600; 310; 30; 20 MG/100ML; MG/100ML; MG/100ML; MG/100ML
125 INJECTION, SOLUTION INTRAVENOUS CONTINUOUS
Status: DISCONTINUED | OUTPATIENT
Start: 2021-08-25 | End: 2021-08-25 | Stop reason: HOSPADM

## 2021-08-25 RX ORDER — SODIUM CHLORIDE, SODIUM LACTATE, POTASSIUM CHLORIDE, CALCIUM CHLORIDE 600; 310; 30; 20 MG/100ML; MG/100ML; MG/100ML; MG/100ML
100 INJECTION, SOLUTION INTRAVENOUS CONTINUOUS
Status: DISCONTINUED | OUTPATIENT
Start: 2021-08-25 | End: 2021-08-25 | Stop reason: HOSPADM

## 2021-08-25 RX ORDER — OXYCODONE AND ACETAMINOPHEN 5; 325 MG/1; MG/1
2 TABLET ORAL
Status: DISCONTINUED | OUTPATIENT
Start: 2021-08-25 | End: 2021-08-25 | Stop reason: HOSPADM

## 2021-08-25 RX ORDER — GLYCOPYRROLATE 0.2 MG/ML
INJECTION INTRAMUSCULAR; INTRAVENOUS AS NEEDED
Status: DISCONTINUED | OUTPATIENT
Start: 2021-08-25 | End: 2021-08-25 | Stop reason: HOSPADM

## 2021-08-25 RX ORDER — OXYCODONE AND ACETAMINOPHEN 5; 325 MG/1; MG/1
1 TABLET ORAL
Qty: 28 TABLET | Refills: 0 | Status: SHIPPED | OUTPATIENT
Start: 2021-08-25 | End: 2021-09-24

## 2021-08-25 RX ORDER — TRANEXAMIC ACID 10 MG/ML
1 INJECTION, SOLUTION INTRAVENOUS ONCE
Status: DISCONTINUED | OUTPATIENT
Start: 2021-08-25 | End: 2021-08-25 | Stop reason: HOSPADM

## 2021-08-25 RX ORDER — SODIUM CHLORIDE, SODIUM LACTATE, POTASSIUM CHLORIDE, AND CALCIUM CHLORIDE .6; .31; .03; .02 G/100ML; G/100ML; G/100ML; G/100ML
IRRIGANT IRRIGATION AS NEEDED
Status: DISCONTINUED | OUTPATIENT
Start: 2021-08-25 | End: 2021-08-25 | Stop reason: HOSPADM

## 2021-08-25 RX ORDER — ONDANSETRON 2 MG/ML
4 INJECTION INTRAMUSCULAR; INTRAVENOUS
Status: DISCONTINUED | OUTPATIENT
Start: 2021-08-25 | End: 2021-08-25 | Stop reason: HOSPADM

## 2021-08-25 RX ORDER — FENTANYL CITRATE 50 UG/ML
INJECTION, SOLUTION INTRAMUSCULAR; INTRAVENOUS AS NEEDED
Status: DISCONTINUED | OUTPATIENT
Start: 2021-08-25 | End: 2021-08-25 | Stop reason: HOSPADM

## 2021-08-25 RX ORDER — OXYCODONE AND ACETAMINOPHEN 10; 325 MG/1; MG/1
2 TABLET ORAL
Status: DISCONTINUED | OUTPATIENT
Start: 2021-08-25 | End: 2021-08-25 | Stop reason: HOSPADM

## 2021-08-25 RX ORDER — ROCURONIUM BROMIDE 10 MG/ML
INJECTION, SOLUTION INTRAVENOUS AS NEEDED
Status: DISCONTINUED | OUTPATIENT
Start: 2021-08-25 | End: 2021-08-25 | Stop reason: HOSPADM

## 2021-08-25 RX ORDER — TRANEXAMIC ACID 10 MG/ML
1 INJECTION, SOLUTION INTRAVENOUS SEE ADMIN INSTRUCTIONS
Status: COMPLETED | OUTPATIENT
Start: 2021-08-25 | End: 2021-08-25

## 2021-08-25 RX ORDER — ONDANSETRON 2 MG/ML
INJECTION INTRAMUSCULAR; INTRAVENOUS AS NEEDED
Status: DISCONTINUED | OUTPATIENT
Start: 2021-08-25 | End: 2021-08-25 | Stop reason: HOSPADM

## 2021-08-25 RX ORDER — MELOXICAM 15 MG/1
15 TABLET ORAL DAILY
Qty: 30 TABLET | Refills: 0 | Status: SHIPPED | OUTPATIENT
Start: 2021-08-26 | End: 2022-04-06

## 2021-08-25 RX ORDER — ASPIRIN 325 MG
325 TABLET, DELAYED RELEASE (ENTERIC COATED) ORAL 2 TIMES DAILY
Qty: 60 TABLET | Refills: 0 | Status: SHIPPED | OUTPATIENT
Start: 2021-08-25 | End: 2021-08-25

## 2021-08-25 RX ORDER — NALOXONE HYDROCHLORIDE 0.4 MG/ML
0.4 INJECTION, SOLUTION INTRAMUSCULAR; INTRAVENOUS; SUBCUTANEOUS AS NEEDED
Status: DISCONTINUED | OUTPATIENT
Start: 2021-08-25 | End: 2021-08-25 | Stop reason: HOSPADM

## 2021-08-25 RX ORDER — ASPIRIN 325 MG
325 TABLET, DELAYED RELEASE (ENTERIC COATED) ORAL 2 TIMES DAILY
Status: DISCONTINUED | OUTPATIENT
Start: 2021-08-25 | End: 2021-08-25 | Stop reason: HOSPADM

## 2021-08-25 RX ORDER — CEFAZOLIN SODIUM/WATER 2 G/20 ML
2 SYRINGE (ML) INTRAVENOUS EVERY 8 HOURS
Status: DISCONTINUED | OUTPATIENT
Start: 2021-08-25 | End: 2021-08-25 | Stop reason: HOSPADM

## 2021-08-25 RX ORDER — ALBUTEROL SULFATE 0.83 MG/ML
2.5 SOLUTION RESPIRATORY (INHALATION)
Status: DISCONTINUED | OUTPATIENT
Start: 2021-08-25 | End: 2021-08-25 | Stop reason: HOSPADM

## 2021-08-25 RX ORDER — NEOSTIGMINE METHYLSULFATE 1 MG/ML
INJECTION, SOLUTION INTRAVENOUS AS NEEDED
Status: DISCONTINUED | OUTPATIENT
Start: 2021-08-25 | End: 2021-08-25 | Stop reason: HOSPADM

## 2021-08-25 RX ORDER — ACETAMINOPHEN 325 MG/1
650 TABLET ORAL
Status: DISCONTINUED | OUTPATIENT
Start: 2021-08-25 | End: 2021-08-25 | Stop reason: HOSPADM

## 2021-08-25 RX ORDER — LISINOPRIL 5 MG/1
10 TABLET ORAL DAILY
Status: DISCONTINUED | OUTPATIENT
Start: 2021-08-25 | End: 2021-08-25 | Stop reason: HOSPADM

## 2021-08-25 RX ORDER — MELOXICAM 15 MG/1
15 TABLET ORAL DAILY
Qty: 30 TABLET | Refills: 0 | Status: SHIPPED | OUTPATIENT
Start: 2021-08-26 | End: 2021-08-25

## 2021-08-25 RX ORDER — LIDOCAINE HYDROCHLORIDE 20 MG/ML
INJECTION, SOLUTION EPIDURAL; INFILTRATION; INTRACAUDAL; PERINEURAL AS NEEDED
Status: DISCONTINUED | OUTPATIENT
Start: 2021-08-25 | End: 2021-08-25 | Stop reason: HOSPADM

## 2021-08-25 RX ORDER — SODIUM CHLORIDE 0.9 % (FLUSH) 0.9 %
5-40 SYRINGE (ML) INJECTION EVERY 8 HOURS
Status: DISCONTINUED | OUTPATIENT
Start: 2021-08-25 | End: 2021-08-25 | Stop reason: HOSPADM

## 2021-08-25 RX ORDER — FENTANYL CITRATE 50 UG/ML
25 INJECTION, SOLUTION INTRAMUSCULAR; INTRAVENOUS AS NEEDED
Status: DISCONTINUED | OUTPATIENT
Start: 2021-08-25 | End: 2021-08-25 | Stop reason: HOSPADM

## 2021-08-25 RX ORDER — OXYCODONE AND ACETAMINOPHEN 5; 325 MG/1; MG/1
1 TABLET ORAL
Qty: 28 TABLET | Refills: 0 | Status: SHIPPED | OUTPATIENT
Start: 2021-08-25 | End: 2021-08-25

## 2021-08-25 RX ORDER — DIPHENHYDRAMINE HYDROCHLORIDE 50 MG/ML
12.5 INJECTION, SOLUTION INTRAMUSCULAR; INTRAVENOUS
Status: DISCONTINUED | OUTPATIENT
Start: 2021-08-25 | End: 2021-08-25 | Stop reason: HOSPADM

## 2021-08-25 RX ORDER — NALOXONE HYDROCHLORIDE 0.4 MG/ML
0.1 INJECTION, SOLUTION INTRAMUSCULAR; INTRAVENOUS; SUBCUTANEOUS AS NEEDED
Status: DISCONTINUED | OUTPATIENT
Start: 2021-08-25 | End: 2021-08-25 | Stop reason: HOSPADM

## 2021-08-25 RX ORDER — MELOXICAM 7.5 MG/1
15 TABLET ORAL DAILY
Status: DISCONTINUED | OUTPATIENT
Start: 2021-08-26 | End: 2021-08-25 | Stop reason: HOSPADM

## 2021-08-25 RX ORDER — CEFAZOLIN SODIUM/WATER 2 G/20 ML
2 SYRINGE (ML) INTRAVENOUS ONCE
Status: COMPLETED | OUTPATIENT
Start: 2021-08-25 | End: 2021-08-25

## 2021-08-25 RX ORDER — PROPOFOL 10 MG/ML
INJECTION, EMULSION INTRAVENOUS AS NEEDED
Status: DISCONTINUED | OUTPATIENT
Start: 2021-08-25 | End: 2021-08-25 | Stop reason: HOSPADM

## 2021-08-25 RX ORDER — ROPIVACAINE HYDROCHLORIDE 5 MG/ML
INJECTION, SOLUTION EPIDURAL; INFILTRATION; PERINEURAL
Status: COMPLETED | OUTPATIENT
Start: 2021-08-25 | End: 2021-08-25

## 2021-08-25 RX ADMIN — FENTANYL CITRATE 50 MCG: 50 INJECTION, SOLUTION INTRAMUSCULAR; INTRAVENOUS at 12:44

## 2021-08-25 RX ADMIN — ROPIVACAINE HYDROCHLORIDE 30 ML: 5 INJECTION, SOLUTION EPIDURAL; INFILTRATION; PERINEURAL at 11:20

## 2021-08-25 RX ADMIN — SODIUM CHLORIDE, SODIUM LACTATE, POTASSIUM CHLORIDE, AND CALCIUM CHLORIDE 125 ML/HR: 600; 310; 30; 20 INJECTION, SOLUTION INTRAVENOUS at 16:52

## 2021-08-25 RX ADMIN — FENTANYL CITRATE 50 MCG: 50 INJECTION, SOLUTION INTRAMUSCULAR; INTRAVENOUS at 13:16

## 2021-08-25 RX ADMIN — LIDOCAINE HYDROCHLORIDE 100 MG: 20 INJECTION, SOLUTION INTRAVENOUS at 12:21

## 2021-08-25 RX ADMIN — FENTANYL CITRATE 50 MCG: 50 INJECTION, SOLUTION INTRAMUSCULAR; INTRAVENOUS at 13:31

## 2021-08-25 RX ADMIN — FENTANYL CITRATE 100 MCG: 50 INJECTION, SOLUTION INTRAMUSCULAR; INTRAVENOUS at 11:16

## 2021-08-25 RX ADMIN — Medication 3 MG: at 13:45

## 2021-08-25 RX ADMIN — SODIUM CHLORIDE, SODIUM LACTATE, POTASSIUM CHLORIDE, AND CALCIUM CHLORIDE 125 ML/HR: 600; 310; 30; 20 INJECTION, SOLUTION INTRAVENOUS at 10:33

## 2021-08-25 RX ADMIN — CEFAZOLIN 2 G: 10 INJECTION, POWDER, FOR SOLUTION INTRAVENOUS at 19:28

## 2021-08-25 RX ADMIN — TRANEXAMIC ACID 1 G: 10 INJECTION, SOLUTION INTRAVENOUS at 13:41

## 2021-08-25 RX ADMIN — ROCURONIUM BROMIDE 50 MG: 10 INJECTION, SOLUTION INTRAVENOUS at 12:23

## 2021-08-25 RX ADMIN — TRANEXAMIC ACID 1 G: 10 INJECTION, SOLUTION INTRAVENOUS at 12:30

## 2021-08-25 RX ADMIN — LISINOPRIL 10 MG: 5 TABLET ORAL at 18:12

## 2021-08-25 RX ADMIN — SODIUM CHLORIDE, SODIUM LACTATE, POTASSIUM CHLORIDE, AND CALCIUM CHLORIDE 300 ML/HR: 600; 310; 30; 20 INJECTION, SOLUTION INTRAVENOUS at 15:52

## 2021-08-25 RX ADMIN — FENTANYL CITRATE 50 MCG: 50 INJECTION, SOLUTION INTRAMUSCULAR; INTRAVENOUS at 12:21

## 2021-08-25 RX ADMIN — GLYCOPYRROLATE 0.2 MG: 0.2 INJECTION INTRAMUSCULAR; INTRAVENOUS at 12:19

## 2021-08-25 RX ADMIN — ONDANSETRON HYDROCHLORIDE 4 MG: 2 INJECTION INTRAMUSCULAR; INTRAVENOUS at 12:19

## 2021-08-25 RX ADMIN — PROPOFOL 150 MG: 10 INJECTION, EMULSION INTRAVENOUS at 12:22

## 2021-08-25 RX ADMIN — ASPIRIN 325 MG: 325 TABLET, COATED ORAL at 20:01

## 2021-08-25 RX ADMIN — CEFAZOLIN 2 G: 1 INJECTION, POWDER, FOR SOLUTION INTRAVENOUS at 12:31

## 2021-08-25 RX ADMIN — GLYCOPYRROLATE 0.4 MG: 0.2 INJECTION INTRAMUSCULAR; INTRAVENOUS at 13:45

## 2021-08-25 RX ADMIN — SODIUM CHLORIDE, SODIUM LACTATE, POTASSIUM CHLORIDE, AND CALCIUM CHLORIDE: 600; 310; 30; 20 INJECTION, SOLUTION INTRAVENOUS at 13:25

## 2021-08-25 NOTE — INTERVAL H&P NOTE
Update History & Physical    The Patient's History and Physical was reviewed with the patient and I examined the patient. There was no change. The surgical site was confirmed by the patient and me. Plan:  The risk, benefits, expected outcome, and alternative to the recommended procedure have been discussed with the patient. Patient understands and wants to proceed with the procedure.     Electronically signed by Jeovany Vega MD on 8/25/2021 at 11:34 AM

## 2021-08-25 NOTE — OP NOTES
[address]  Total Hip Arthroplasty      Patient: Sherry Guidry MRN: [id number]  SSN: [id number]    YOB: 1935  Age: 80 y.o. Sex: male      Date of Procedure: 8/25/2021   Preoperative Diagnosis: LEFT HIP OSTEOARTHRITIS  Postoperative Diagnosis: LEFT HIP OSTEOARTHRITIS    Procedure: Procedure(s):  LEFT TOTAL HIP REPLACEMENT ANTERIOR APPROACH W/C-ARM   Surgeon: Angelica Watson MD  Assistant(s):   Anesthesia: General   Estimated Blood Loss: 400  Specimens: * No specimens in log *   Complications: None  Implants:   Implant Name Type Inv. Item Serial No.  Lot No. LRB No. Used Action   clusterhole shell     [employer] [id number] Left 1 Implanted   hex hole dome plue     [employer] [id number] Left 1 Implanted   SCREW ACET HEX LOW PROFILE 6.5X25MM TRIDENT II - [id number]  SCREW ACET HEX LOW PROFILE 6.5X25MM TRIDENT II  [employer] [facility] [id number] Left 1 Implanted   INSERT ACET F 0 DEG 36 MM HIP X3 TRIDENT - [id number]  INSERT ACET F 0 DEG 36 MM HIP X3 TRIDENT  [employer] [facility] [id number] Left 1 Implanted   SCREW ACET HEX LOW PROFILE 6.5X25MM TRIDENT II - [id number]  SCREW ACET HEX LOW PROFILE 6.5X25MM TRIDENT II  [employer] [facility] Left 1 Implanted   STEM FEM T SZ 7 L102MM [id number] NK L34MM 132DEG L HENNING - [id number]  STEM FEM T SZ 7 L102MM [id number] NK L34MM 132DEG L HA  [employer] [facility] [id number] Left 1 Implanted   HEAD FEM DELT V40 -2.5MM 36MM -- V40 BIOLOX - [id number]  HEAD FEM DELT V40 -2.5MM 36MM -- V40 BIOLOX  [employer] [facility] [id number] Left 1 Implanted       Procedure Detail:  After the patient was brought to the operating suite, He was effectively anesthetized using general anesthesia, then transferred to the Dalton table and secured in a standard fashion. His hip was then prepped and draped in a normal sterile orthopedic fashion. He was given appropriate intravenous antibiotics preoperatively.  After a proper timeout was performed, a direct anterior approach to the hip was performed using a short Hadley-Lowery interval. Anterior capsulotomy was performed. The degenerative changes of the hip were noted. Femoral neck osteotomy was then performed to the templated area. The head and neck were removed. The pulvinar and labrum were excised. The acetabulum was then reamed up until good bleeding cancellous bone was obtained. The acetabulum was then irrigated with pulse lavage system. The cup was then impacted in place with excellent stable fixation obtained, placing the cup at about 45 degrees of abduction, 20 degrees of anteversion. The liner was then impacted in place. A single screw placed. Attention was turned to the femur, which was delivered into the wound with a combination of extension, external rotation, and adduction, and using the hook on the Friendship table to deliver the femur into the wound. The canal was broached up to the appropriate size for the stem system with excellent stable fixation obtained. A trial reduction was then performed. Leg length and stability were check under flouroscopic imaging. The hip was gently dislocated and trials were removed. The canal was irrigated with the pulse lavage system. The final components were impacted in place with excellent stable fixation obtained once again. The final reduction was performed and once again leg lengths and offset were verified radiographically. The wound was then irrigated one more time, and then closed in layers. The fascia of the tensor was closed with #1 Vicryl in a running type stitch. Subcutaneous tissue was closed with 2-0 Vicryl in a simple buried fashion. The skin was closed with 3-0 monocryl. Steri-strips and a sterile compressive dressing was applied. The patient tolerated this well, was transferred to their bed, and taken to recovery room, extubated, in stable condition. All sponge and needle counts were reported as correct.     Signed By: MD Fredo Vizcaino 25, 2021

## 2021-08-25 NOTE — PERIOP NOTES
Reviewed PTA medication list with patient/caregiver and patient/caregiver denies any additional medications. Patient admits to having a responsible adult care for them at home for at least 24 hours after surgery. Patient encouraged to use gown warming system and informed that using said warming gown to regulate body temperature prior to a procedure has been shown to help reduce the risks of blood clots and infection. Patient's pharmacy of choice verified and documented in PTA medication section. Dual skin assessment & fall risk band verification completed with Alexandre Mireles.

## 2021-08-25 NOTE — DISCHARGE INSTRUCTIONS
Total Hip Arthroplasty Discharge Instructions   Jessica Peterson M.D. Please take the time to review the following instructions before you leave the hospital and use them as guidelines during your recovery from surgery. If you have any questions you may contact my office at [phone]. Wound Care / Dressing Changes:     Two days after your surgery date you should remove your dressing. A big, bulky dressing isn't necessary as long as there isn't any drainage from the incisions. If there is drainage you can put a band-aid, primapore, or mepilex dressing over the incision and change it daily until drainage stops. It isn't necessary to apply antibiotic ointment to your incisions. If you have glue over your incision do not peel it off. If you have steri-strips over your incision they will start to peel off in 7-10 days. They don't need to be removed prior to that. When they begin to peel off you can remove them. They should all be removed by 14 days from you surgery. Keep a towel or gauze in any skin folds that may hang over the incision so that it stays dry. Niyah Page / Bathing: You may only shower. You may shower if there is no drainage from your incisions. Your dressing may be removed for showering. You may get your incisions wet in the shower. Don't vigorously scrub the area where your incisions are. Apply a clean, dry dressing after drying off the area of your incisions. Don't take a tub bath, get in a swimming pool or Jacuzzi until the incisions are completely healed. Do not soak your incisions under water. Weight Bearing Status / Braces:     _____ Weight bearing as tolerated. Use crutches, walker, or cane as needed for support. You may move your joints as much as tolerated.     _____  Violet [place] Touch\" weight bearing. Don't bear weight on the leg you had operated on. Use your toes only to steady yourself as you ambulate with crutches or a walker.      _____ Avoid extreme hip extension with external rotation. Home Health:    Home health has been arranged for skilled nursing visits and physical therapy. Your home health has been set up through____________ . If no one from the agency calls you on the day after you arrive home, please contact them at the number provided at discharge. Physical therapy for gait training and strengthening. Continue therapeutic exercises. Physical Therapy:       Begin In-Home Physical Therapy; 3 times a week to work on gait training, range of motion, strengthening, and weight bearing exercises as tolerable. Continue to use your walker or cane when walking; may progress from the walker to a cane to complete total bearing as tolerable. Ice / Elevation:     Continue ice and elevation as needed for pain and swelling. Diet:     Resume your pre hospital diet. If you have excessive nausea or vomiting call your doctor's office. Medication:     1. You will be given a prescription for pain medication when you are discharged for the hospital. Take the medication as needed according to the directions on the prescription bottle. Possible side effects of the medication include dizziness, headache, nausea, vomiting, constipation and urinary retention. If you experience any of these side effects call the office so that we can assist you in relieving them. Discontinue the use of the pain medication if you develop itching, rash, shortness of breath or difficulties swallowing. If these symptoms become severe or aren't relieved by discontinuing the medication you should seek immediate medical attention. Refills of pain medication are authorized during office hours only. (am - pm Mon. thru Fri.)     1. You should take one Aspirin 325 MG twice daily for one month from the date of your surgery. This will help to prevent blood clots from forming in your legs. 2. You may resume the medication you were taking prior to your surgery.  Pain medication may change the effects of any antidepressant medication you are taking. If you have any questions about possible interactions between your regular medications and the pain medication you should consult the physician who prescribes your regular medications. 3. Please take over the counter stool softeners while you are taking narcotic pain medication. Pain medications can cause constipation. Stool softeners, warm prune juice and increasing your water and fiber intake can help prevent constipation. Do not take laxatives. Follow Up Appointment[de-identified]    Please call [phone] for a follow appointment with Dr. Andres Pratt in 10-14 days from the time of your surgery. Please let our office know you are scheduling a post-op appointment. Signs and Symptoms to be Aware of: If any of the following signs and symptoms occur, you should contact Dr. Deonna Richards office. Please be advised if problem arises which you feel requires immediate medical attention or you are unable to contact Dr. Deonna Richards office, you should seek immediate medical attention at the emergency department or other health care facility you have access to. Signs and symptoms to watch for include:     1. A sudden increase in swelling and or redness or warmth at the area your surgery was performed which isn't relieved by rest, ice and elevation. 2 Oral temperature greater than 101.5 degrees for 12 hours or more which isn't relieved by an increase in fluid intake and taking two Tylenol every 4-6 hours. 3 Excessive drainage from your incisions, or drainage which hasn't stopped by 72 hours after your surgery despite applying a compressive dressing, ice and elevation. 4 Calf pain, tenderness, redness or swelling which isn't relieved with rest and elevation. 5 Fever, chills, shortness of breath, chest pain, nausea, vomiting or other signs and symptoms which are of concern to you.      Other Instructions:

## 2021-08-25 NOTE — PROGRESS NOTES
4407-BP elevated 172/71, paged MARCY Nicholas to ask if he can start lisinopil 10 mg now, fluids were reduced from 300 ml/hr to 125.    2000-This writer has reviewed discharge instructions with patient at this time. Patient has verbalized understanding. Patient was provided with care notes to include side effects of RX's. Arm bands removed and shredded. AVS reviewed with macrina WALLER RN.

## 2021-08-25 NOTE — ANESTHESIA PROCEDURE NOTES
L ROSSI Peripheral Block    Start time: 8/25/2021 11:17 AM  End time: 8/25/2021 11:20 AM  Performed by: Burke Moran MD  Authorized by: Burke Moran MD       Pre-procedure:    Indications: at surgeon's request and post-op pain management    Preanesthetic Checklist: patient identified, risks and benefits discussed, site marked, timeout performed, anesthesia consent given and patient being monitored    Timeout Time: 11:26 EDT          Block Type:   Block Type:  Pericapsular Nerve Group (PENG)  Laterality:  Left  Monitoring:  Responsive to questions, standard ASA monitoring, continuous pulse ox, oxygen, frequent vital sign checks and heart rate  Injection Technique:  Single shot  Procedures: ultrasound guided    Patient Position: supine  Prep: chlorhexidine    Location:  Upper thigh  Needle Type:  Stimuplex  Needle Gauge:  20 G  Needle Localization:  Ultrasound guidance  Medication Injected:  Ropivacaine (PF) (NAROPIN) 5 mg/mL (0.5 %) injection, 30 mL  Med Admin Time: 8/25/2021 11:20 AM    Assessment:  Number of attempts:  1  Injection Assessment:  Incremental injection every 5 mL, negative aspiration for blood, no intravascular symptoms, no paresthesia and ultrasound image on chart  Patient tolerance:  Patient tolerated the procedure well with no immediate complications

## 2021-08-25 NOTE — ANESTHESIA PREPROCEDURE EVALUATION
Relevant Problems   No relevant active problems       Anesthetic History   No history of anesthetic complications            Review of Systems / Medical History  Patient summary reviewed and pertinent labs reviewed    Pulmonary  Within defined limits            Pertinent negatives: No sleep apnea and smoker     Neuro/Psych   Within defined limits           Cardiovascular    Hypertension: well controlled          CAD    Exercise tolerance: >4 METS     GI/Hepatic/Renal             Pertinent negatives: No hiatal hernia and GERD   Endo/Other        Arthritis     Other Findings              Physical Exam    Airway  Mallampati: II  TM Distance: > 6 cm  Neck ROM: normal range of motion   Mouth opening: Normal     Cardiovascular    Rhythm: regular  Rate: normal         Dental         Pulmonary  Breath sounds clear to auscultation               Abdominal  GI exam deferred       Other Findings            Anesthetic Plan    ASA: 3  Anesthesia type: general      Post-op pain plan if not by surgeon: peripheral nerve block single    Induction: Intravenous  Anesthetic plan and risks discussed with: Patient

## 2021-08-25 NOTE — PROGRESS NOTES
Transition of Care (TAL) Plan:          Pt admitted for an elective surgical procedure - left total hip replacement on 8/25/21. Care manager discussed [facility] for [facility] and patient has selected [facility]. Patient lives with wife and she will be his transport home when clears PT. Verified patient has rolling walker and they have requested elevated toilet seat and shower chair; orders placed. Verified PCP as Dr. Everette Matthews, last seen in July. Pt is independent. Please encourage ambulation. No transition of care needs identified at this time. Anticipate pt will be medically stable for discharge within the next 24-48 hours with physician follow up. CM available to assist as needed. TAL Transportation:   How is patient being transported at discharge? Family/Friend      When? Once cleared by physician     Is transport scheduled? N/A      Follow-up appointment and transportation:   PCP/Specialist?  See AVS for Appointment         Who is transporting to the follow-up appointment? Self/Family/Friend      Is transport for follow up appointment scheduled? N/A    Communication plan (with patient/family): Who is being called? Patient or Next of Kin? Responsible party? Patient      What number(s) is to be used? See Facesheet      What service provider is calling for [facility] services? When are they calling? Readmission Risk? (Green/Low; Yellow/Moderate; Red/High):  Ursula here to complete [address] including selection of the Healthcare Decision Maker Relationship (ie \"Primary\")    Thania Benavides, spouse - Primary - [phone]    Care Management Interventions  PCP Verified by CM:  Yes  Mode of Transport at Discharge: Self  Transition of Care Consult (CM Consult): Discharge Planning, [address]: Yes  Physical Therapy Consult: Yes  Occupational Therapy Consult: Yes  Current Support Network: Own Home, Lives with Spouse  Confirm Follow Up Transport: Family  The Plan for Transition of Care is Related to the Following Treatment Goals : left total hip replacement  The Patient and/or Patient Representative was Provided with a Choice of Provider and Agrees with the Discharge Plan?: Yes  Name of the Patient Representative Who was Provided with a Choice of Provider and Agrees with the Discharge Plan: Duncan Grade, patient  Freedom of Choice List was Provided with Basic Dialogue that Supports the Patient's Individualized Plan of Care/Goals, Treatment Preferences and Shares the Quality Data Associated with the Providers?: Yes  Discharge Location  Discharge Placement: Home with home health

## 2021-08-25 NOTE — ANESTHESIA POSTPROCEDURE EVALUATION
Post-Anesthesia Evaluation and Assessment    Cardiovascular Function/Vital Signs  Visit Vitals  /71   Pulse (!) 57   Temp 36.3 °C (97.3 °F)   Resp 15   Ht 5' 10\" (1.778 m)   Wt 89.3 kg (196 lb 12.8 oz)   SpO2 99%   BMI 28.24 kg/m²       Patient is status post Procedure(s):  LEFT TOTAL HIP REPLACEMENT ANTERIOR APPROACH W/C-ARM . Nausea/Vomiting: Controlled. Postoperative hydration reviewed and adequate. Pain:  Pain Scale 1: Numeric (0 - 10) (08/25/21 1517)  Pain Intensity 1: 0 (08/25/21 1517)   Managed. Neurological Status:   Neuro (WDL): Within Defined Limits (08/25/21 1012)   At baseline. Mental Status and Level of Consciousness: Arousable. Pulmonary Status:   O2 Device: Nasal cannula (08/25/21 1447)   Adequate oxygenation and airway patent. Complications related to anesthesia: None    Post-anesthesia assessment completed. No concerns. Patient has met all discharge requirements. Signed By: Harry Porter MD    August 25, 2021               Procedure(s):  LEFT TOTAL HIP REPLACEMENT ANTERIOR APPROACH W/C-ARM . general    <BSHSIANPOST>    INITIAL Post-op Vital signs:   Vitals Value Taken Time   /71 08/25/21 1517   Temp 36.3 °C (97.3 °F) 08/25/21 1442   Pulse 47 08/25/21 1524   Resp 12 08/25/21 1524   SpO2 100 % 08/25/21 1524   Vitals shown include unvalidated device data.

## 2021-08-25 NOTE — PROGRESS NOTES
Transferred Pt to [facility], [facility] RN at bedside. Dressing CDI. Left pt stable. dual skin assessment      08/25/21 1555   Modified Mark Score   Activity 2   Respiration 2   Circulation 2   Consciousness 1   O2 Saturation 1   Mark Score 8   Vital Signs   Temp 98.2 °F (36.8 °C)   Temp Source Oral   Pulse (Heart Rate) (!) 52   Resp Rate 20   BP (!) 154/97   Oxygen Therapy   O2 Sat (%) 95 %

## 2021-08-25 NOTE — PROGRESS NOTES
Problem: Mobility Impaired (Adult and Pediatric)  Goal: *Acute Goals and Plan of Care (Insert Text)  Description: PT goals to be met in 1 day:  Pt will be able to perform supine<>sit SBA for transfers at home. Pt will be able to perform sit<>stand SBA for increased ability to transfer at home safely. Pt will be able to participate in gt training >100' w/ RW, WBAT, GB and CGA/SBA for improved ability in home upon d/c. Pt will be able to perform stair training step to pattern, B/U rail and CGA to obtain safe entry into home upon d/c. Pt will be educated regarding HEP per MD protocol for optimal AROM/strength outcomes. Note: [x]  Patient has met MD mobilization critieria for d/c home   [x]  Recommend HH with 24 hour adult care   []  Benefit from additional acute PT session to address:      PHYSICAL THERAPY EVALUATION    Patient: Tevin Clements (84 y.o. male)  Date: 8/25/2021  Primary Diagnosis: Hip osteoarthritis [M16.9]  Procedure(s) (LRB):  LEFT TOTAL HIP REPLACEMENT ANTERIOR APPROACH W/C-ARM  (Left) Day of Surgery   Precautions:   Fall, WBAT    PLOF: Independent    ASSESSMENT :  Based on the objective data described below, the patient presents with decreased mobility in regards to bed mobility, transfers, gt quality and tolerance, balance, stair negotiation and safety due to L JOHAN surgery. Decreased AROM of L hip, dec strength of L hip, pain in L hip, dec sensation of L hip also impacting pt functional mobility. Pt rating pain on numerical pain scale pre/post and during session 0/10. Pt and wife ed regarding mobility safety, WB, HEP, ice application/use, environmental safety and home safe techniques. Pt able to perform supine<>sit w/ CGA additional time and sit<>stand w/ CGA/SBA. Safety vc required throughout session to reinforce safety. Pt able to participate in gt training using RW, GB, WBAT and CGA w/ antalgic gt pattern.   Pt required frequent vc as pt would  back legs of RW, be outside of RW, and let go of RW. Pt was able to participate in stair training using step to pattern, B rails and CGA. Answered questions by pt and wife in regards to PT and mobility. Pt left supine in bed/ w/ all needs within reach and ice pack to L hip. Nurse Jesse Olvera aware of session and outcomes. Recommend HHPT with responsible adult care at least 24 hours upon hospital d/c. Patient will benefit from skilled intervention to address the above impairments. Patient's rehabilitation potential is considered to be Good  Factors which may influence rehabilitation potential include:   []         None noted  []         Mental ability/status  []         Medical condition  []         Home/family situation and support systems  []         Safety awareness  [x]         Pain tolerance/management  []         Other:      PLAN :  Recommendations and Planned Interventions:   [x]           Bed Mobility Training             []    Neuromuscular Re-Education  [x]           Transfer Training                   []    Orthotic/Prosthetic Training  [x]           Gait Training                          [x]    Modalities  [x]           Therapeutic Exercises           [x]    Edema Management/Control  [x]           Therapeutic Activities            [x]    Family Training/Education  [x]           Patient Education  []           Other (comment):    Frequency/Duration: Patient will be followed by physical therapy 1-2 times per day/4-7 days per week to address goals. Discharge Recommendations: Home Health  Further Equipment Recommendations for Discharge: N/A     SUBJECTIVE:   Patient stated Anna Stevens had my other hip done.     OBJECTIVE DATA SUMMARY:     Past Medical History:   Diagnosis Date    Arthritis     hip    Bladder cancer ([facility] [address] 2021    BPH (benign prostatic hyperplasia)     Burning with urination     CAD (coronary artery disease)     Difficulty urinating     GERD (gastroesophageal reflux disease)     Heart abnormalities 06/01/2012     2 STENTS RIGHT BARTERY 100% BLOCKAGE     History of prostate cancer     Hyperlipidemia     Hypertension 2016    Incontinence     Nocturia     Prostate CA ([address] 2009    Screening for prostate cancer     Stress incontinence of urine     Urge incontinence of urine      Past Surgical History:   Procedure Laterality Date    EYE SERVICE OR PROCEDURE  1/2012    REMOVAL OF GROWTH ON LT EYE    HX CATARACT REMOVAL  2010    BILAT    HX HIP REPLACEMENT  7/2006    RT HIP    HX KNEE REPLACEMENT  11/2011    LT KNEE    HX MOHS PROCEDURES  1992    RT SHOULDER    HX ORTHOPAEDIC      right total hip replacement    HX ORTHOPAEDIC  2019    right shoulder reconstruction    HX PROSTATECTOMY  11/2009    DVP-DR Adilson Tejada UROLOGICAL  03/2012    Advance Male Sling    HX UROLOGICAL  12/15/2020    Cysto, TURBT, instillation of mitomycin. Dr. Ashvin White. HX UROLOGICAL  03/16/2012    Advance sling placement, cysto. Dr. Marian Jacob, [facility]. HX UROLOGICAL  11/16/2009    Robotic assisted radical prostatectomy, without bilateral nerve sparing. Dr. Belkis Samson UROLOGICAL  05/07/2006    prostate bx. path benign. Dr. Belkis Samson UROLOGICAL  05/13/2005    prostate bx. path benign. Dr. Belkis Samson UROLOGICAL  07/18/2000    prostate bx. path benign.  Dr. Jose Alberto Han UROLOGICAL      bladder tumors *3    [place] CARDIAC SURG PROCEDURE UNLIST  06/01/2012    2 STENTS RIGHT SIDE     [place] KNEE SCOPE,CLEAN/DRAIN  1957    [place] KNEE SCOPE,CLEAN/DRAIN  1994    [place] KNEE SCOPE,CLEAN/DRAIN  1993    RT KNEE     Barriers to Learning/Limitations: yes;  anesthesia  Compensate with: Visual Cues, Verbal Cues, and Tactile Cues  Home Situation:  Home Situation  Home Environment: Private residence  # Steps to Enter: 5  Rails to Enter: Yes  Hand Rails : Left  One/Two Story Residence: Two story  # of Interior Steps: 16  Interior Rails: Both  Lift Chair Available: No  Living Alone: No  Support Systems: Spouse/Significant Other/Partner  Patient Expects to be Discharged to[de-identified] House  Current DME Used/Available at Home: Kim Dina, straight, Shower chair, Walker, rolling  Tub or Shower Type: Shower  Critical Behavior:  Neurologic State: Alert  Orientation Level: Oriented to person;Oriented to place;Oriented to situation  Cognition: Follows commands  Safety/Judgement: Awareness of environment  Psychosocial  Patient Behaviors: Cooperative; Talkative  Family  Behaviors: Supportive;Calm  Purposeful Interaction: Yes  Pt Identified Daily Priority: Clinical issues (comment)  Caritas Process: Nurture loving kindness  Skin Condition/Temp: Dry;Warm  Family  Behaviors: Supportive;Calm  Skin Integrity: Incision (comment) (L hip)  Skin Integumentary  Skin Color: Appropriate for ethnicity  Skin Condition/Temp: Dry;Warm  Skin Integrity: Incision (comment) (L hip)  Turgor: Non-tenting  Varicosities: Absent  Strength:    Strength: Generally decreased, functional  Tone & Sensation:   Tone: Normal  Sensation: Impaired (L hip)  Range Of Motion:  AROM: Generally decreased, functional  Functional Mobility:  Bed Mobility:  Scooting: Contact guard assistance (vc)  Transfers:  Sit to Stand: Contact guard assistance (vc)  Stand to Sit: Contact guard assistance;Stand-by assistance (vc)  Balance:   Sitting: Intact  Standing: Intact; With support  Ambulation/Gait Training:  Distance (ft): 150 Feet (ft)  Assistive Device: Walker, rolling;Gait belt  Ambulation - Level of Assistance: Contact guard assistance (vc)  Gait Abnormalities: Antalgic;Decreased step clearance  Left Side Weight Bearing: As tolerated  Base of Support: Shift to right  Stance: Left decreased  Speed/Sobia: Slow  Step Length: Left shortened;Right shortened  Swing Pattern: Left asymmetrical;Right asymmetrical  Interventions: Safety awareness training; Tactile cues; Verbal cues; Visual/Demos  Stairs:  Number of Stairs Trained: 5  Stairs - Level of Assistance: Contact guard assistance (vc)  Rail Use: Both  Therapeutic Exercises:   Encouraged HEP  Pain:  Pain level pre-treatment: 0/10   Pain level post-treatment: 0/10   Pain Intervention(s) : Medication (see MAR); Rest, Ice, Repositioning  Response to intervention: Nurse notified, See doc flow    Activity Tolerance:   Fair   Please refer to the flowsheet for vital signs taken during this treatment. After treatment:   []         Patient left in no apparent distress sitting up in chair  [x]         Patient left in no apparent distress in bed  [x]         Call bell left within reach  [x]         Nursing notified  []         Caregiver present  []         Bed alarm activated  []         SCDs applied    COMMUNICATION/EDUCATION:   [x]         Role of Physical Therapy in the acute care setting. [x]         Fall prevention education was provided and the patient/caregiver indicated understanding. [x]         Patient/family have participated as able in goal setting and plan of care. [x]         Patient/family agree to work toward stated goals and plan of care. []         Patient understands intent and goals of therapy, but is neutral about his/her participation. []         Patient is unable to participate in goal setting/plan of care: ongoing with therapy staff.  []         Other:     Thank you for this referral.  Kristine Cabrera, PT   Time Calculation: 39 mins      Eval Complexity: History: HIGH Complexity :3+ comorbidities / personal factors will impact the outcome/ POC Exam:MEDIUM Complexity : 3 Standardized tests and measures addressing body structure, function, activity limitation and / or participation in recreation  Presentation: LOW Complexity : Stable, uncomplicated  Clinical Decision Making:Low Complexity    Overall Complexity:LOW

## 2021-08-25 NOTE — PROGRESS NOTES
Problem: Self Care Deficits Care Plan (Adult)  Goal: *Acute Goals and Plan of Care (Insert Text)  Description: Initial Occupational Therapy Goals (8/25/2021) Within 7 day(s):    1. Patient will perform grooming standing sinkside with supervision for increased independence with ADLs. 2. Patient will perform LB dressing with supervision & A/E PRN for increased independence with ADLs. 3. Patient will perform toilet transfer with supervision for increased independence with ADLs. 4. Patient will perform all aspects of toileting with supervision for increased independence with ADLs. 5. Patient will independently apply energy conservation techniques with 1 verbal cue(s)for increased independence with ADLs. 6. Patient will perform bathroom mobility with supervision for increased independence/safety with ADLs. Outcome: Progressing Towards Goal  OCCUPATIONAL THERAPY EVALUATION    Patient: Leena Espinal (13 y.o. male)  Date: 8/25/2021  Primary Diagnosis: Hip osteoarthritis [M16.9]  Procedure(s) (LRB):  LEFT TOTAL HIP REPLACEMENT ANTERIOR APPROACH W/C-ARM  (Left) Day of Surgery   Precautions: Fall, WBAT  PLOF: pt mod I for ADLs/functional mobility, ambulated with SPC PRN    ASSESSMENT AND RECOMMENDATIONS:  Based on the objective data described below, the patient presents with LLE decreased ROM and strength affecting LE ADLs. Pt found supine in bed, vitals assessed and WNL, pt reporting pain 0/10. Pt talkative throughout session, vc to maintain attention on task and for safety. Pt sat up to EOB with SBA. Educated pt on proper body mechanics s/p THR including adaptive strategies for lower body ADLs. Pt requesting to stand and use urinal, pt performed STS with CGA. Pt CGA for toileting/bladder hygiene, assisted pt to don clean brief in standing due to urinary incontinence. Pt CGA for bathroom mobility, vc for safe use of RW. Pt ambulated back to EOB, and returned to supine with SBA, ice applied to L hip.  Spouse present during session for education on home safety. Provided opportunity for pt to voice questions on ADL performance when home, pt has no further concerns. Patient will benefit from skilled Occupational Therapy intervention to maximize safety/independence with ADLs at d/c.    Education: Reviewed home safety, body mechanics, importance of moving every hour to prevent joint stiffness, role of ice for edema/pain control, Rolling Walker management/safety, and adaptive dressing techniques with patient verbalizing  understanding at this time     Patient will benefit from skilled intervention to address the above impairments. Patient's rehabilitation potential is considered to be Good  Factors which may influence rehabilitation potential include:   []             None noted  []             Mental ability/status  []             Medical condition  []             Home/family situation and support systems  [x]             Safety awareness  []             Pain tolerance/management  []             Other:        PLAN :  Recommendations and Planned Interventions:   [x]               Self Care Training                  [x]      Therapeutic Activities  [x]               Functional Mobility Training   []      Cognitive Retraining  []               Therapeutic Exercises           []      Endurance Activities  []               Balance Training                    []      Neuromuscular Re-Education  []               Visual/Perceptual Training     [x]      Home Safety Training  [x]               Patient Education                   [x]      Family Training/Education  []               Other (comment):    Frequency/Duration: Patient will be followed by Occupational Therapy 1-2 times per day/4-7 days per week to address goals. Discharge Recommendations: Home health   Further Equipment Recommendations for Discharge: N/A     SUBJECTIVE:   Patient stated I need to put on a brief or something because I'm leaking all over the place.     OBJECTIVE DATA SUMMARY:     Past Medical History:   Diagnosis Date    Arthritis     hip    Bladder cancer ([facility] [address] 2021    BPH (benign prostatic hyperplasia)     Burning with urination     CAD (coronary artery disease)     Difficulty urinating     GERD (gastroesophageal reflux disease)     Heart abnormalities 06/01/2012     2 STENTS RIGHT BARTERY 100% BLOCKAGE     History of prostate cancer     Hyperlipidemia     Hypertension 2016    Incontinence     Nocturia     Prostate CA ([facility] [address] 2009    Screening for prostate cancer     Stress incontinence of urine     Urge incontinence of urine      Past Surgical History:   Procedure Laterality Date    EYE SERVICE OR PROCEDURE  1/2012    REMOVAL OF GROWTH ON LT EYE    HX CATARACT REMOVAL  2010    BILAT    HX HIP REPLACEMENT  7/2006    RT HIP    HX KNEE REPLACEMENT  11/2011    LT KNEE    HX MOHS PROCEDURES  1992    RT SHOULDER    HX ORTHOPAEDIC      right total hip replacement    HX ORTHOPAEDIC  2019    right shoulder reconstruction    HX PROSTATECTOMY  11/2009    DVP-DR Trilby Cranker UROLOGICAL  03/2012    Advance Male Sling    HX UROLOGICAL  12/15/2020    Cysto, TURBT, instillation of mitomycin. Dr. Halley Montano. HX UROLOGICAL  03/16/2012    Advance sling placement, cysto. Dr. Sandra Cartagena, [facility]. HX UROLOGICAL  11/16/2009    Robotic assisted radical prostatectomy, without bilateral nerve sparing. Dr. Jesus Alberto Elaine UROLOGICAL  05/07/2006    prostate bx. path benign. Dr. Jesus Alberto Elaine UROLOGICAL  05/13/2005    prostate bx. path benign. Dr. Jesus Alberto Elaine UROLOGICAL  07/18/2000    prostate bx. path benign.  Dr. Samuel Thomas UROLOGICAL      bladder tumors *3    [place] CARDIAC SURG PROCEDURE UNLIST  06/01/2012    2 STENTS RIGHT SIDE     [place] KNEE SCOPE,CLEAN/DRAIN  1957    [place] KNEE SCOPE,CLEAN/DRAIN  1994    [place] KNEE SCOPE,CLEAN/DRAIN  1993    RT KNEE     Barriers to Learning/Limitations: yes;  physical and altered mental status (i.e.Sedation, Confusion)  Compensate with: visual, verbal, tactile, kinesthetic cues/model    Home Situation/Prior Level of Function:   Home Situation  Home Environment: Private residence  # Steps to Enter: 5  Rails to Enter: Yes  Hand Rails : Left  One/Two Story Residence: Two story  # of Interior Steps: 16  Interior Rails: Both  Lift Chair Available: No  Living Alone: No  Support Systems: Spouse/Significant Other/Partner  Patient Expects to be Discharged toVF Cor[de-identified]ration  Current DME Used/Available at Home: Cane, straight, Shower chair, Walker, rolling  Tub or Shower Type: Shower  []  Right hand dominant   []  Left hand dominant    Cognitive/Behavioral Status:  Neurologic State: Alert  Orientation Level: Oriented to person;Oriented to place;Oriented to situation  Cognition: Follows commands  Safety/Judgement: Awareness of environment    Skin: L hip incision w/ Mepilex   Edema: compression hose in place & applied ice     Coordination: BUE  Coordination: Within functional limits  Fine Motor Skills-Upper: Left Intact; Right Intact    Gross Motor Skills-Upper: Left Intact; Right Intact    Balance:  Sitting: Intact  Standing: Intact; With support    Strength: BUE  Strength: Generally decreased, functional    Tone & Sensation:BUE  Tone: Normal  Sensation: Impaired (L hip)     Range of Motion: BUE  AROM: Generally decreased, functional    Functional Mobility and Transfers for ADLs:  Bed Mobility:  Scooting: Contact guard assistance (vc)     Transfers:  Sit to Stand: Contact guard assistance (vc)    ADL Assessment:  Feeding: Independent  Oral Facial Hygiene/Grooming: Contact guard assistance  Bathing: Minimum assistance  Upper Body Dressing: Supervision  Lower Body Dressing: Minimum assistance  Toileting: Contact guard assistance     ADL Intervention:  Lower Body Dressing Assistance  Dressing Assistance: Minimum assistance  Socks: Minimum assistance  Leg Crossed Method Used: No  Position Performed: Seated edge of bed  Cues: Verbal cues provided;Visual cues provided    Toileting  Toileting Assistance: Contact guard assistance  Bladder Hygiene: Contact guard assistance  Clothing Management: Contact guard assistance    Cognitive Retraining  Safety/Judgement: Awareness of environment    Pain:  Pain level pre-treatment: 0/10  Pain level post-treatment: 1/10  Pain Intervention(s): Medication administer by Nursing (see MAR); Rest, Ice, Repositioning   Response to intervention: Nurse notified, see doc flow     Activity Tolerance:   Fair. Patient able to stand ~7 minute(s). Patient able to complete ADLs with intermittent rest breaks. Patient limited by pain, strength, ROM. Patient unsteady. Please refer to the flowsheet for vital signs taken during this treatment. After treatment:   []  Patient left in no apparent distress sitting up in chair  []  Patient sitting on EOB  [x]  Patient left in no apparent distress in bed  [x]  Call bell left within reach  [x]  Nursing notified  [x]  Caregiver present  [x]  Ice applied  []  SCD's on while back in bed  [] Bed alarm activated    COMMUNICATION/EDUCATION:   Communication/Collaboration:  [x]       Role of Occupational Therapy in the acute care setting. [x]      Home safety education was provided and the patient/caregiver indicated understanding. [x]      Patient/family have participated as able in goal setting and plan of care. [x]      Patient/family agree to work toward stated goals and plan of care. []      Patient understands intent and goals of therapy, but is neutral about his/her participation. []      Patient is unable to participate in plan of care at this time. Thank you for this referral.  Dmitry Sibley, OTR/L  Time Calculation: 38 mins    Eval Complexity: History: MEDIUM Complexity : Expanded review of history including physical, cognitive and psychosocial  history ;    Examination: MEDIUM Complexity : 3-5 performance deficits relating to physical, cognitive , or psychosocial skils that result in activity limitations and / or participation restrictions; Decision Making:MEDIUM Complexity : Patient may present with comorbidities that affect occupational performnce.  Miniml to moderate modification of tasks or assistance (eg, physical or verbal ) with assesment(s) is necessary to enable patient to complete evaluation

## 2021-08-26 ENCOUNTER — HOME CARE VISIT (OUTPATIENT)
Dept: SCHEDULING | Facility: HOME HEALTH | Age: 86
End: 2021-08-26
Payer: COMMERCIAL

## 2021-08-26 ENCOUNTER — HOME CARE VISIT (OUTPATIENT)
Dept: HOME HEALTH SERVICES | Facility: HOME HEALTH | Age: 86
End: 2021-08-26

## 2021-08-26 VITALS
OXYGEN SATURATION: 95 % | SYSTOLIC BLOOD PRESSURE: 138 MMHG | RESPIRATION RATE: 16 BRPM | TEMPERATURE: 96.4 F | HEART RATE: 84 BPM | DIASTOLIC BLOOD PRESSURE: 60 MMHG

## 2021-08-26 PROCEDURE — A6212 FOAM DRG <=16 SQ IN W/BORDER: HCPCS

## 2021-08-26 PROCEDURE — 400013 HH SOC

## 2021-08-26 PROCEDURE — G0299 HHS/HOSPICE OF RN EA 15 MIN: HCPCS

## 2021-08-26 PROCEDURE — 400018 HH-NO PAY CLAIM PROCEDURE

## 2021-08-26 NOTE — HOME HEALTH
Skilled services/Home bound verification:     Skilled Reason for admission/summary of clinical condition: LT THR. left hip non remove dressing until 8/30/2021. no strike through drainage or redness noted. support hose in place. This patient is homebound for the following reasons Requires considerable and taxing effort to leave the home  and Requires the assistance of 1 or more persons to leave the home . Caregiver: spouse. Caregiver assists with ADL/IADLS. Medications reconciled and all medications are available in the home this visit. The following education was provided regarding medications, medication interactions, and look alike medications (specify): moderate interactions noted. Medications  are too early to assess effectiveness. at this time. High risk medication teaching regarding anticoagulants, hyperglycemic agents or opoid narcotics performed (specify) oxycodone: . Opoid risk and education: Tolerance-meaning you might need to take more of a medication for the same pain relief  Physical dependence-meaning you have symptoms of withdrawal when a medication is stopped  Increased sensitivity to pain  Constipation Nausea, vomiting, and dry mouth Sleepiness and dizziness  Confusion  Depression Low levels of testosterone that can result in lower sex drive, energy, and strength /Itching and sweating          Home health supplies by type and quantity ordered/delivered this visit include: mepilex dressing ordered. Patient education provided this visit to include: Reviewed fall precautions and safety:dangle, uses assistive device at all times, non skid foot wear,and night light. Verbalized understanding. Reviewed to protect skin with dry cloth before applying ice. don't apply ice to bare skin. Reviewed to reported any redness, swelling, warmth, fever, chills, increased heart/respiratory rate, uncontrolled pain/tenderness, drainage:green/yellow/brown, or odor to MD immediately. High protein diet to improve wound healing ex.: meat, fish, eggs, yogurt, or beans. Verbalized understanding and will attempt 5-6 small meals to improve appetite. Reviewed s/sx of DVT/interventions: report/seek medical treatment immediately for warmth, redness, swelling or pain to either BLE. AMBULATE HOURLY, FOOT PUMPS, CONT TO WEAR SUPPORT HOSE, HYDRATION,AND TAKE ALL MEDICATIONS AS PRESCRIBED. Reviewed CAD:to notify home health/provider for: Chest pain that is more frequent, or chest pain at rest; questions or concerns about condition or care. Notify emergency services for signs of a heart attack: Squeezing, pressure, or pain in chest; discomfort or pain in back, neck, jaw, stomach, or arm; shortness of breath; nausea or vomiting; lightheadedness or a sudden cold sweat. Notify emergency services for signs of a stroke: Numbness or drooping on one side of face, weakness in an arm or leg, confusion or difficulty speaking, dizziness, severe headache, or vision loss. advised to call medical provider for non-life-threatening concerns before going to ER/urgent care. reviewed HTN: to notify home health/provider for: Feeling faint, dizzy, confused, or drowsy; continually high blood pressure readings despite taking medications; questions or concerns about condition or care. Notify emergency services for signs of a heart attack: Squeezing, pressure, or pain in chest; discomfort or pain in back, neck, jaw, stomach, or arm; shortness of breath; nausea or vomiting; lightheadedness or a sudden cold sweat. Notify emergency services for signs of a stroke: Numbness or drooping on one side of face, weakness in an arm or leg, confusion or difficulty speaking, dizziness, severe headache, or vision loss. Report any s/sx of abnormal bleeding to MD immediately/seek medical treatment for any: black tarry stools, dark/tea/blood, hematoma, dizziness, frequent gum/nose bleeds, unusual bruising, or prolong bleeding.               Patient/caregiver degree of understanding:fair    Home exercise program/Homework provided: admission packet in the home and medication list in booklet. reviewed to keep admission in place where can locate for education. Pt/Caregiver instructed on plan of care and are agreeable to plan of care at this time. Physician Dr Quentin Nieves notified of patient admission to home health and plan of care including anticipated frequency of 1 week 1, 2 week 1, 1 week 1 and treatments/interventions/modalities of LT THR. Discharge planning discussed with patient and caregiver. Discharge planning as follows: Will discharge when all LT THR/HTN/ CAD disease process, complication and dietary goals are met and understands all medication purpose/frequencies/side effects. Pt/Caregiver did verbalize understanding of discharge planning. Next MD appointment TBA (date) with Dr Quentin Nieves MD/NP/PA. Patient/caregiver encouraged/instructed to keep appointment as lack of follow through with physician appointment could result in discontinuation of home care services for non-compliance.

## 2021-08-27 ENCOUNTER — HOME CARE VISIT (OUTPATIENT)
Dept: SCHEDULING | Facility: HOME HEALTH | Age: 86
End: 2021-08-27
Payer: COMMERCIAL

## 2021-08-27 VITALS
SYSTOLIC BLOOD PRESSURE: 138 MMHG | OXYGEN SATURATION: 97 % | HEART RATE: 68 BPM | DIASTOLIC BLOOD PRESSURE: 68 MMHG | TEMPERATURE: 97.7 F

## 2021-08-27 PROCEDURE — G0151 HHCP-SERV OF PT,EA 15 MIN: HCPCS

## 2021-08-30 ENCOUNTER — HOME CARE VISIT (OUTPATIENT)
Dept: SCHEDULING | Facility: HOME HEALTH | Age: 86
End: 2021-08-30
Payer: COMMERCIAL

## 2021-08-30 ENCOUNTER — HOME CARE VISIT (OUTPATIENT)
Dept: HOME HEALTH SERVICES | Facility: HOME HEALTH | Age: 86
End: 2021-08-30
Payer: COMMERCIAL

## 2021-08-30 VITALS
DIASTOLIC BLOOD PRESSURE: 85 MMHG | OXYGEN SATURATION: 97 % | TEMPERATURE: 97.7 F | SYSTOLIC BLOOD PRESSURE: 140 MMHG | HEART RATE: 72 BPM

## 2021-08-30 PROCEDURE — G0157 HHC PT ASSISTANT EA 15: HCPCS

## 2021-08-30 PROCEDURE — G0300 HHS/HOSPICE OF LPN EA 15 MIN: HCPCS

## 2021-08-30 NOTE — CASE COMMUNICATION
Therapy Functional Score Assessment  Question   Score   Grooming  2       Upper Dressing 2      Lower Dressing 2      Bathing  5      Toilet Transfer  1    Transfer  2            Ambulation  3   Dyspnea                     2  ?       Pain Interfering with activity 4  Est number therapy visits      7

## 2021-08-30 NOTE — HOME HEALTH
PATIENT EDUCATION PROVIDED THIS VISIT:  Home safety to include use correct AD, proper footwear, HEP, walking, deep breathing, clear pathways, night light. HEP consisting of:  1. Walking every hour during the day with FWW   2. Supine: Ankle pumps x 20, quad sets x 5 sec hold, heel slides, SAQ, all x 10 reps. Sitting:  LAQ with 5 sec hold, knee raises with 3 sec hold, hip adduction isometric with pillow/ball, 5 sec hold, all x 10. All to be progressed to 20 reps and completed 3x/day. 3. Deep breathing   Written HEP issued, patient/caregiver verbalized understanding; however, will need reinforcement and continued education for progression of HEP, ther-ex, gait training, transfer training, stair training and bed mobility training. .    . ASSESSMENT AND CONTINUED NEED FOR THE FOLLOWING SKILLS: Physical Therapy. Misael Payment PLAN: PT 1w1, 3w2    DISCHARGE PLANNING DISCUSSED: Discharge to self and family under MD supervision once all goals have been met or patient has reached max potential. Patient/caregiver verbalized understanding. PMHx:    List of Comorbitites:    Arthritis, hip    Bladder cancer ([address] 2021    BPH (benign prostatic hyperplasia)      Burning with urination      CAD (coronary artery disease)      Difficulty urinating      GERD (gastroesophageal reflux disease)      Heart abnormali ties 06/01/2012      2 STENTS RIGHT BARTERY 100% BLOCKAGE    History of prostate cancer      Hyperlipidemia      Hypertension 2016    Incontinence      Nocturia      Prostate CA [facility]) 2009    Screening for prostate cancer      Stress incontinence of urine      Urge incontinence of urine   . SUBJECTIVE:  Patient is an 80 y.o. male referred to [facility] for HHPT s/p L THR, anterior approach, by Dr. Jett Sutherland. 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 He reports an active lifestyle that has been limited by his hip pain.   Today he presents with pain, swelling, stiffness and weakness i the L hip that is consistent withs/p THR. LIVING SITUATION:  Patient lives in a 2 story home with his wife and granddaughter. The bedroom is on the 2nd floor. He slept in his recliner the first night, and reports he plans to sleep in the bed on 2nd floor tonight. CG is spouse and she is available 24/7 to assist him as needed. REQUIRES CAREGIVER ASSISTANCE FOR:  ADLs, meals, meds, exiting home, stair navigation, safety, errands, household chores, transportation. PLOF:  Independent, working outside the home, driving. MEDICATIONS RECONCILED AND UPDATED AS FOLLOWS: No changes, reviewed pain med instructions re: opiod and Tylenol. NEXT MD APPT:  9/2/21. Patient/caregiver encouraged/instructed to keep appointment as lack of follow through with physician appointment could result in discontinuation of home care services for non-compliance. .  ROM:  RLE:  WFL, L hip:  decreased AROM due to pain, swelling stffness and weakness, L knee:  WFL. STRENGTH: L hip: 3-/5, L knee: 3+/5, R LE 4+/5 WFL, see PT ROM for details. WOUNDS:  L anterior hip incision, dressing intact, no s/s of infection. See wound addendum. BED MOBILITY: Unable to assess today. Will need assistance to support L LE on and off the bed, Mod A. TRANSFERS: SBA and VC for safety due to impairment from pain meds and L hip pain. GAIT: FWW, SBA. Gait characterized by Decreased L stance, decreased B heel strike, B step length, antalgic quality, slow mehnaz and speed and with flexed posture. Cues/instruction provided to: Increase upright posture, step through gait. [employer] STAIRS:  About 4-5 steps to enter home and about 12+ to 2nd floor, handrails present. BALANCE:  Pt scored 15/28 on Tinetti Balance Assessment placing patient at high risk for falls.

## 2021-08-31 NOTE — HOME HEALTH
Subjective:  Pt reports doing well. .  Pain currently:  L thigh Tender to the touch,  3-4/10  sensitive   Best in 24 hours:  0/10 if he doesn't move   Worst in 24 hours: 3-4/10    . Caregiver involvement/assistance needed for: meals, meds and transportation  . Home health supplies by type and quantity ordered/delivered this visit include:  none  . Objective:  See interventions     Assessment and progress towards goals:   Patient with Ned Poor return demonstration prior to re education. Good post re education. Vitals within defined parameters. No s/sx of infection detected. Gait with RW with good stability. Gait without AD with Fair + stability. He will be ready for a cane in the next 1-2 visits. f/u with Dr Thania Blanco 9/9 Thania Blanco  . Home Health PT is medically necessary to address the following:  pain, decreased ROM, decreased strength, increased edema, impaired bed mobility, decreased Ind with functional transfers, impaired gait, impaired stair negotiation, and impaired stability, to decrease sx, improve functional Ind, quality of life, reduce the fall risk. Plan for next visit: Add standing exercises, assess transfers in the home   .   The following discharge planning discussed with the patient/caregiver: Estimated DC 9/10

## 2021-09-01 ENCOUNTER — HOME CARE VISIT (OUTPATIENT)
Dept: SCHEDULING | Facility: HOME HEALTH | Age: 86
End: 2021-09-01
Payer: COMMERCIAL

## 2021-09-01 VITALS
DIASTOLIC BLOOD PRESSURE: 80 MMHG | TEMPERATURE: 97.8 F | HEART RATE: 80 BPM | SYSTOLIC BLOOD PRESSURE: 122 MMHG | OXYGEN SATURATION: 98 %

## 2021-09-01 VITALS
HEART RATE: 80 BPM | RESPIRATION RATE: 16 BRPM | TEMPERATURE: 97 F | SYSTOLIC BLOOD PRESSURE: 110 MMHG | OXYGEN SATURATION: 94 % | DIASTOLIC BLOOD PRESSURE: 70 MMHG

## 2021-09-01 PROCEDURE — G0157 HHC PT ASSISTANT EA 15: HCPCS

## 2021-09-01 NOTE — HOME HEALTH
SN reason for visit: s/p L THR     Caregiver involvement: Patient's cg is wife. she lives with patient and is available at all times for assistance with iadls, adls, meal prep, medication management, taking to md appointments. .    Medications reconciled and all medications are available in the home this visit. The following education was provided regarding medications, medication interactions, and look a like medications. Medications  are effective at this time. Home health supplies by type and quantity ordered/delivered this visit include: n/a    Patient education provided this visit:  **Mepilex dressing change to L HIP due on today. Dressing to remain intact until seen for SN follow up or appointment with Dr. Oneil Mckeon**  dressing changed as ordered, healing well with no s/s of infection present. pt aware to keep dressing clean, dry and intact as ordered. pt aware to keep incision clean and dry as ordered, to report any new drainage to staff. patient made aware to monitor for s/s of infection [increased swelling, increased redness around site, increased pain, foul smelling drainage, fever] aware who to report to/when. pt is aware to monitor for s/s of DVT- tenderness, heat, firmness and or localized swelling in calf muscle. patient encouraged to monitor for increase in pain and to continue with current pain management and to notify staff/md if pain becomes excrutiating/intolerable. pt instructed to follow a high protein diet for healing- to try to get 90g protein daily. patient instructed to maintain clear pathways in home and to minimize clutter to prevent falls from occurring/minimize fall potential.  patient to continue to take medications as prescribed. patient aware to monitor for effectiveness and to notify staff of any adverse reactions to medications/any changes to medication regimen. reviewed side effects, purposes, dosage, frequencies  Patient is a fall risk.  Educated pateint to sit on the side of the chair/bed, take a slow deep breaths, have feet firmly planted before standing up, use cane/walker if available, or have someone to assist.  INSTRUCTED PATIENT AND CG THAT SHOULD ANY NEEDS OR CONCERNS ARISE TO FIRST CALL OUR OFFICE, OR THE DR'S OFFICE  OR GO TO AN URGENT CARE CENTER AND NOT TO THE ED FOR NON-LIFE THREATENING EVENTS. IF IT IS LIFE THREATENING THEN CALL 911 OR GO TO THE CLOSEST ER. Progress toward goals: Patient's personal goal is to have complete wound healing and remain free of infection. Home exercise program: activity as tolerated, trying to get physical activity 4-5 x weekly, 30 minutes daily. stopping activity if causing shortness of breath or chest pain, dizziness or weakness and per PT orders. Continued need for the following skills: Nursing and Physical Therapy    The following discharge planning was discussed with the pt/caregiver: Patient will be discharged once education has completed, patient is medically stable and pt/cg are able to independently manage dressing changes/ incisional care.

## 2021-09-01 NOTE — HOME HEALTH
Subjective: The leg is getting less sore to the touch  . Pain currently: 2/10 L thigh with touch   Best in 24 hours:  2/10   Worst in 24 hours:  0/10   . Caregiver involvement/assistance needed for: meals, meds and transportation  . Home health supplies by type and quantity ordered/delivered this visit include: none  . Objective:  See interventions. .  Assessment and progress towards goals:   Pt with Good tolerance and return demonstration to his new standing exercises. He reports gradual decrease in sx. He was able to demonstrate increased stability amb with his [facility] today. .  Home Health PT is medically necessary to address the following:  pain, decreased ROM, decreased strength, impaired bed mobility, decreased Ind with functional transfers, impaired gait, impaired stair negotiation, and impaired stability, to decrease sx, improve functional Ind, quality of life, reduce the fall risk. Plan for next visit:  Tinetti Balance Assessment, bed transfers  .   The following discharge planning discussed with the patient/caregiver: Estimated DC

## 2021-09-02 ENCOUNTER — HOME CARE VISIT (OUTPATIENT)
Dept: SCHEDULING | Facility: HOME HEALTH | Age: 86
End: 2021-09-02
Payer: COMMERCIAL

## 2021-09-02 PROCEDURE — G0300 HHS/HOSPICE OF LPN EA 15 MIN: HCPCS

## 2021-09-03 ENCOUNTER — HOME CARE VISIT (OUTPATIENT)
Dept: SCHEDULING | Facility: HOME HEALTH | Age: 86
End: 2021-09-03
Payer: COMMERCIAL

## 2021-09-03 VITALS
SYSTOLIC BLOOD PRESSURE: 120 MMHG | OXYGEN SATURATION: 98 % | DIASTOLIC BLOOD PRESSURE: 80 MMHG | RESPIRATION RATE: 16 BRPM | TEMPERATURE: 97.2 F | HEART RATE: 83 BPM

## 2021-09-03 PROCEDURE — G0157 HHC PT ASSISTANT EA 15: HCPCS

## 2021-09-04 NOTE — HOME HEALTH
Subjective: I took your advice on the pineapple juice. It helped! I am not as stiff. .  Pain currently: none today  Best in 24 hours: yesterday was tender to the touch, tight to the movement   Worst in 24 hours: 2/10   . Caregiver involvement/assistance needed for: meals, transportation  . Home health supplies by type and quantity ordered/delivered this visit include:  none  . Objective:  See interventions. Assessment and progress towards goals:  Pt reports compliance with HEP. He reports decreased pain and tightness. He reports increased ease of activities and gait. Tinetti Balance Assessment:  Pt with a 7 point increase since last assessed. He has progressed from high to medium fall risk. today he scored 22/28 to meet current goal of 21/28. Will reassess a 2nd time to ensure consistency with stability. .  Home Health PT is medically necessary to address the following: decreased strength, impaired gait, impaired stair negotiation and impaired stability, to decrease sx, improve functional Ind, quality of life, reduce the fall risk. Plan for next visit: Progress exercises. Car transfer  .   The following discharge planning discussed with the patient/caregiver: Estimated DC 9/10

## 2021-09-06 ENCOUNTER — HOME CARE VISIT (OUTPATIENT)
Dept: SCHEDULING | Facility: HOME HEALTH | Age: 86
End: 2021-09-06
Payer: COMMERCIAL

## 2021-09-06 PROCEDURE — G0300 HHS/HOSPICE OF LPN EA 15 MIN: HCPCS

## 2021-09-07 VITALS
HEART RATE: 70 BPM | OXYGEN SATURATION: 98 % | SYSTOLIC BLOOD PRESSURE: 110 MMHG | HEART RATE: 75 BPM | DIASTOLIC BLOOD PRESSURE: 70 MMHG | OXYGEN SATURATION: 96 % | TEMPERATURE: 97 F | SYSTOLIC BLOOD PRESSURE: 108 MMHG | DIASTOLIC BLOOD PRESSURE: 60 MMHG | TEMPERATURE: 98 F

## 2021-09-07 NOTE — HOME HEALTH
SN reason for visit: s/p L THR     Caregiver involvement: Patient's cg is wife. she lives with patient and is available at all times for assistance with iadls, adls, meal prep, medication management, taking to md appointments. Medications reconciled and all medications are available in the home this visit. The following education was provided regarding medications, medication interactions, and look a like medications. Medications  are effective at this time. Home health supplies by type and quantity ordered/delivered this visit include: n/a    Patient education provided this visit:  **Mepilex dressing change to L HIP due on today. Dressing to remain intact until seen for SN follow up or appointment with Dr. Garret Braga **  dressing changed as ordered, healing well with no s/s of infection present. pt aware to keep dressing clean, dry and intact as ordered. pt aware to keep incision clean and dry as ordered, to report any new drainage to staff. patient made aware to monitor for s/s of infection [increased swelling, increased redness around site, increased pain, foul smelling drainage, fever] aware who to report to/when. pt is aware to monitor for s/s of DVT- tenderness, heat, firmness and or localized swelling in calf muscle. patient encouraged to monitor for increase in pain and to continue with current pain management and to notify staff/md if pain becomes excrutiating/intolerable. pt instructed to follow a high protein diet for healing- to try to get 90g protein daily. patient instructed to maintain clear pathways in home and to minimize clutter to prevent falls from occurring/minimize fall potential.  patient to continue to take medications as prescribed. patient aware to monitor for effectiveness and to notify staff of any adverse reactions to medications/any changes to medication regimen. reviewed side effects, purposes, dosage, frequencies  Patient is a fall risk.  Educated pateint to sit on the side of the chair/bed, take a slow deep breaths, have feet firmly planted before standing up, use cane/walker if available, or have someone to assist.  INSTRUCTED PATIENT AND CG THAT SHOULD ANY NEEDS OR CONCERNS ARISE TO FIRST CALL OUR OFFICE, OR THE DR'S OFFICE  OR GO TO AN URGENT CARE CENTER AND NOT TO THE ED FOR NON-LIFE THREATENING EVENTS. IF IT IS LIFE THREATENING THEN CALL 911 OR GO TO THE CLOSEST ER. Progress toward goals: Patient's personal goal is to have complete wound healing and remain free of infection. All SN goals met at this time. Home exercise program: activity as tolerated, trying to get physical activity 4-5 x weekly, 30 minutes daily. stopping activity if causing shortness of breath or chest pain, dizziness or weakness. Continued need for the following skills: Physical Therapy    The following discharge planning was discussed with the pt/caregiver: Pt.clinically discharged and documentation finalized for completion of agency discharge.

## 2021-09-07 NOTE — HOME HEALTH
SN reason for visit: s/p L THR Caregiver involvement: Patient's cg is wife. she lives with patient and is available at all times for assistance with iadls, adls, meal prep, medication management, taking to md appointments. . Medications reconciled and all medications are available in the home this visit. The following education was provided regarding medications, medication interactions, and look a like medications. Medications are effective at this time. Home health supplies by type and quantity ordered/delivered this visit include: n/a Patient education provided this visit: **Mepilex dressing change to L HIP due on today. Dressing to remain intact until seen for SN follow up or appointment with Dr. Marybeth Delgadillo** dressing changed as ordered, healing well with no s/s of infection present. pt aware to keep dressing clean, dry and intact as ordered. pt aware to keep incision clean and dry as ordered, to report any new drainage to staff. patient made aware to monitor for s/s of infection [increased swelling, increased redness around site, increased pain, foul smelling drainage, fever] aware who to report to/when. pt is aware to monitor for s/s of DVT- tenderness, heat, firmness and or localized swelling in calf muscle. patient encouraged to monitor for increase in pain and to continue with current pain management and to notify staff/md if pain becomes excrutiating/intolerable. pt instructed to follow a high protein diet for healing- to try to get 90g protein daily. patient instructed to maintain clear pathways in home and to minimize clutter to prevent falls from occurring/minimize fall potential. patient to continue to take medications as prescribed. patient aware to monitor for effectiveness and to notify staff of any adverse reactions to medications/any changes to medication regimen. reviewed side effects, purposes, dosage, frequencies Patient is a fall risk.  Educated pateint to sit on the side of the chair/bed, take a slow deep breaths, have feet firmly planted before standing up, use cane/walker if available, or have someone to assist. INSTRUCTED PATIENT AND CG THAT SHOULD ANY NEEDS OR CONCERNS ARISE TO FIRST CALL OUR OFFICE, OR THE DR'S OFFICE OR GO TO AN URGENT CARE CENTER AND NOT TO THE ED FOR NON-LIFE THREATENING EVENTS. IF IT IS LIFE THREATENING THEN CALL 911 OR GO TO THE CLOSEST ER. Progress toward goals: Patient's personal goal is to have complete wound healing and remain free of infection. Home exercise program: activity as tolerated, trying to get physical activity 4-5 x weekly, 30 minutes daily. stopping activity if causing shortness of breath or chest pain, dizziness or weakness and per PT orders. Continued need for the following skills: Nursing and Physical Therapy The following discharge planning was discussed with the pt/caregiver: Patient will be discharged once education has completed, patient is medically stable and pt/cg are able to independently manage dressing changes/ incisional care.

## 2021-09-08 ENCOUNTER — HOME CARE VISIT (OUTPATIENT)
Dept: SCHEDULING | Facility: HOME HEALTH | Age: 86
End: 2021-09-08
Payer: COMMERCIAL

## 2021-09-08 VITALS
SYSTOLIC BLOOD PRESSURE: 120 MMHG | TEMPERATURE: 97.3 F | RESPIRATION RATE: 16 BRPM | OXYGEN SATURATION: 98 % | HEART RATE: 61 BPM | DIASTOLIC BLOOD PRESSURE: 80 MMHG

## 2021-09-08 PROCEDURE — G0157 HHC PT ASSISTANT EA 15: HCPCS

## 2021-09-08 NOTE — HOME HEALTH
Subjective: Pt reports his only concern is tightness upon standing and walking then it works itself out. .  Pain currently:  0/10  Best in 24 hours: 0/10   Worst in 24 hours: 0/10   . Caregiver involvement/assistance needed for: Patients wife will drive him to the MD apt. .  Home health supplies by type and quantity ordered/delivered this visit include:  none  . Objective:  See interventions. .  Assessment and progress towards goals:   Pt denies any pain. He does report tightness that resolves as he moves or exercises. All transfers with MOD I - Ind to meet goal.  Pt is semi compliant with his HEP. Pt reports return to most normal activities. .  The following discharge planning discussed with the patient/caregiver: Pt to be DC next session from PT. he reports not wanting to go to OP PT. He feels he does not need it and is too busy for it.

## 2021-09-10 ENCOUNTER — HOME CARE VISIT (OUTPATIENT)
Dept: SCHEDULING | Facility: HOME HEALTH | Age: 86
End: 2021-09-10
Payer: COMMERCIAL

## 2021-09-10 PROCEDURE — G0151 HHCP-SERV OF PT,EA 15 MIN: HCPCS

## 2021-09-11 VITALS
SYSTOLIC BLOOD PRESSURE: 120 MMHG | HEART RATE: 63 BPM | DIASTOLIC BLOOD PRESSURE: 64 MMHG | OXYGEN SATURATION: 98 % | TEMPERATURE: 97.7 F | RESPIRATION RATE: 16 BRPM

## 2021-09-11 NOTE — CASE COMMUNICATION
Patient has been discharged from [address] [place] [address], goals met. He plans to continue OPPT x 1-2 weeks. Discharge summary is available.   Thank you for your referral.

## 2022-01-18 PROBLEM — I48.0 PAROXYSMAL ATRIAL FIBRILLATION (HCC): Status: ACTIVE | Noted: 2022-01-18

## 2022-03-18 PROBLEM — M16.9 HIP OSTEOARTHRITIS: Status: ACTIVE | Noted: 2021-08-25

## 2022-03-18 PROBLEM — I48.0 PAROXYSMAL ATRIAL FIBRILLATION (HCC): Status: ACTIVE | Noted: 2022-01-18

## 2022-03-18 PROBLEM — R35.1 NOCTURIA: Status: ACTIVE | Noted: 2017-12-01

## 2022-03-19 PROBLEM — I10 ESSENTIAL HYPERTENSION: Status: ACTIVE | Noted: 2018-06-15

## 2022-03-19 PROBLEM — C67.9 MALIGNANT NEOPLASM OF URINARY BLADDER (HCC): Status: ACTIVE | Noted: 2021-03-15

## 2022-03-19 PROBLEM — N32.89 BLADDER MASS: Status: ACTIVE | Noted: 2020-12-15

## 2022-04-06 PROBLEM — I44.7 LBBB (LEFT BUNDLE BRANCH BLOCK): Status: ACTIVE | Noted: 2022-03-08

## 2022-04-11 PROBLEM — I44.7 LBBB (LEFT BUNDLE BRANCH BLOCK): Status: ACTIVE | Noted: 2022-03-08

## 2022-07-27 PROBLEM — E03.9 HYPOTHYROIDISM: Status: ACTIVE | Noted: 2022-06-01

## 2023-01-11 PROBLEM — D64.81 ANEMIA DUE TO AND NOT CONCURRENT WITH CHEMOTHERAPY: Status: ACTIVE | Noted: 2023-01-11

## 2023-01-11 PROBLEM — R53.83 FATIGUE DUE TO TREATMENT: Status: ACTIVE | Noted: 2023-01-11

## 2023-07-26 PROBLEM — D64.9 ANEMIA: Status: ACTIVE | Noted: 2023-01-11

## 2023-07-26 PROBLEM — I48.0 PAROXYSMAL ATRIAL FIBRILLATION (HCC): Status: ACTIVE | Noted: 2021-12-12

## 2023-07-26 PROBLEM — C67.9 MALIGNANT NEOPLASM OF URINARY BLADDER (HCC): Status: ACTIVE | Noted: 2021-03-15

## 2023-07-26 PROBLEM — A41.9 SEPSIS WITHOUT ACUTE ORGAN DYSFUNCTION (HCC): Status: ACTIVE | Noted: 2023-03-18

## 2023-07-26 PROBLEM — R53.1 GENERALIZED WEAKNESS: Status: ACTIVE | Noted: 2023-01-11

## 2023-07-26 PROBLEM — I10 ESSENTIAL HYPERTENSION: Status: ACTIVE | Noted: 2018-06-15

## 2024-01-06 ENCOUNTER — APPOINTMENT (OUTPATIENT)
Facility: HOSPITAL | Age: 89
End: 2024-01-06
Payer: COMMERCIAL

## 2024-01-06 ENCOUNTER — HOSPITAL ENCOUNTER (EMERGENCY)
Facility: HOSPITAL | Age: 89
Discharge: HOME | End: 2024-01-06
Attending: STUDENT IN AN ORGANIZED HEALTH CARE EDUCATION/TRAINING PROGRAM
Payer: COMMERCIAL

## 2024-01-06 VITALS
DIASTOLIC BLOOD PRESSURE: 71 MMHG | HEART RATE: 63 BPM | OXYGEN SATURATION: 98 % | SYSTOLIC BLOOD PRESSURE: 132 MMHG | RESPIRATION RATE: 18 BRPM | TEMPERATURE: 97.3 F

## 2024-01-06 DIAGNOSIS — S52.602A CLOSED FRACTURE OF DISTAL END OF LEFT ULNA, UNSPECIFIED FRACTURE MORPHOLOGY, INITIAL ENCOUNTER: Primary | ICD-10-CM

## 2024-01-06 PROCEDURE — 73110 X-RAY EXAM OF WRIST: CPT

## 2024-01-06 PROCEDURE — 29125 APPL SHORT ARM SPLINT STATIC: CPT

## 2024-01-06 PROCEDURE — 73130 X-RAY EXAM OF HAND: CPT

## 2024-01-06 PROCEDURE — 6370000000 HC RX 637 (ALT 250 FOR IP): Performed by: STUDENT IN AN ORGANIZED HEALTH CARE EDUCATION/TRAINING PROGRAM

## 2024-01-06 PROCEDURE — 73090 X-RAY EXAM OF FOREARM: CPT

## 2024-01-06 PROCEDURE — 99283 EMERGENCY DEPT VISIT LOW MDM: CPT

## 2024-01-06 RX ORDER — ACETAMINOPHEN 325 MG/1
650 TABLET ORAL
Status: COMPLETED | OUTPATIENT
Start: 2024-01-06 | End: 2024-01-06

## 2024-01-06 RX ADMIN — ACETAMINOPHEN 650 MG: 325 TABLET ORAL at 12:56

## 2024-01-06 NOTE — ED PROVIDER NOTES
[facility] EMERGENCY DEPT  EMERGENCY DEPARTMENT ENCOUNTER    Patient Name: Red Paredes  MRN: [id number]  YOB: 1935  Provider: Wilma Soto DO  PCP: Syed Silveira MD   Time/Date of evaluation: 12:39 PM EST on 1/6/24    History of Presenting Illness     History Provided by: Patient  History is limited by: Nothing     HISTORY:   Red Paredes is a 88 y.o. male history of agent orange exposure, bladder cancer, prostate cancer presented hospital today for left wrist injury after a fall.  Patient stated that this was a mechanical fall with denies any syncopal episode denies any head trauma.  Patient is on Xarelto for blood thinner.    Patient states that he tripped on his slipper today and fell.  He had a FOOSH mechanism on his left arm.  He endorses pain in his left wrist.  He noticed a deformity in his left wrist.    Nursing Notes were all reviewed and agreed with or any disagreements were addressed in the HPI.    Past History     PAST MEDICAL HISTORY:  Past Medical History:   Diagnosis Date    Arthritis     hip    Bladder cancer (HCC) 2021    BPH (benign prostatic hyperplasia)     Burning with urination     CAD (coronary artery disease)     Difficulty urinating     GERD (gastroesophageal reflux disease)     History of prostate cancer     Hyperlipidemia     Hypertension 2016    Incontinence     Nocturia     Prostate CA (HCC) 2009    Screening for prostate cancer     Stress incontinence of urine     Urge incontinence of urine        PAST SURGICAL HISTORY:  Past Surgical History:   Procedure Laterality Date    CATARACT REMOVAL  2010    BILAT    EYE SERVICE OR PROCEDURE  1/2012    REMOVAL OF GROWTH ON LT EYE    KNEE SCOPE,CLEAN/DRAIN  1957    KNEE SCOPE,CLEAN/DRAIN  1994    KNEE SCOPE,CLEAN/DRAIN  1993    RT KNEE    MOHS SURGERY  1992    RT SHOULDER    ORTHOPEDIC SURGERY  2019    right shoulder reconstruction    ORTHOPEDIC SURGERY      right total hip replacement    FL UNLISTED PROCEDURE CARDIAC    Physical Activity: Not on file   Stress: Not on file   Social Connections: Not on file   Intimate Partner Violence: Not on file   Depression: Not on file   Housing Stability: Not on file   Interpersonal Safety: Not on file   Utilities: Not on file       Review of Systems     Negative except as listed above in HPI.    Physical Exam     Vitals:    01/06/24 1159   BP: 132/71   Pulse: 63   Resp: 18   Temp: 97.3 °F (36.3 °C)   SpO2: 98%       General: Pleasant, no distress, interacting appropriately   Head: Normacephalic, atraumatic  ENT: oral mucosa moist, neck supple, no tracheal deviation  Extremities: There is some swelling of the left wrist.  No sign of open fracture identified on exam.  Pulse intact, sensation is intact.  Patient is able to move his left hand.  Neurological: Awake and alert, no facial droop noted   Skin: Warm and dry  Psychiatric: Appropriate mood and thoughts    Diagnostic Study Results     LABS:  No results found for this or any previous visit (from the past 12 hour(s)).    RADIOLOGIC STUDIES:   Non x-ray images such as CT, Ultrasound and MRI are read by the radiologist. X-ray images are visualized and preliminarily interpreted by the ED Provider with the findings as listed in the ED Course section below.     Interpretation per the Radiologist is listed below, if available at the time of this note:    XR WRIST LEFT (MIN 3 VIEWS)    (Results Pending)       Procedures     Procedures    ED Course and Medical Decision Making     12:39 PM MARA REYNOLDS (Wilma Soto DO) am the first provider for this patient. Initial assessment performed. I reviewed the vital signs, available nursing notes, past medical history, past surgical history, family history and social history. The patients presenting problems have been discussed, and they are in agreement with the care plan formulated and outlined with them. I have encouraged them to ask questions as they arise throughout their visit.    My differential diagnosis

## 2024-01-06 NOTE — DISCHARGE INSTRUCTIONS
Call for follow-up with the orthopedic clinic for follow-up.  X-ray did show a nondisplaced ulnar fracture.    You may take Tylenol and Motrin as needed for pain control.

## 2024-01-06 NOTE — ED TRIAGE NOTES
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

## 2024-01-15 ENCOUNTER — HOSPITAL ENCOUNTER (OUTPATIENT)
Facility: HOSPITAL | Age: 89
Discharge: HOME | End: 2024-01-15
Payer: MEDICARE

## 2024-01-15 VITALS
RESPIRATION RATE: 16 BRPM | DIASTOLIC BLOOD PRESSURE: 79 MMHG | TEMPERATURE: 97.6 F | SYSTOLIC BLOOD PRESSURE: 148 MMHG | OXYGEN SATURATION: 100 % | HEART RATE: 95 BPM

## 2024-01-15 DIAGNOSIS — S51.812A SKIN TEAR OF LEFT FOREARM WITHOUT COMPLICATION, INITIAL ENCOUNTER: Primary | ICD-10-CM

## 2024-01-15 PROBLEM — S52.202A LEFT ULNAR FRACTURE: Status: ACTIVE | Noted: 2024-01-15

## 2024-01-15 PROCEDURE — 99203 OFFICE O/P NEW LOW 30 MIN: CPT

## 2024-01-15 RX ORDER — CLOBETASOL PROPIONATE 0.5 MG/G
OINTMENT TOPICAL ONCE
Status: CANCELLED | OUTPATIENT
Start: 2024-01-15 | End: 2024-01-15

## 2024-01-15 RX ORDER — LIDOCAINE 50 MG/G
OINTMENT TOPICAL ONCE
Status: CANCELLED | OUTPATIENT
Start: 2024-01-15 | End: 2024-01-15

## 2024-01-15 RX ORDER — CLOBETASOL PROPIONATE 0.5 MG/G
OINTMENT TOPICAL ONCE
OUTPATIENT
Start: 2024-01-15 | End: 2024-01-15

## 2024-01-15 RX ORDER — GINSENG 100 MG
CAPSULE ORAL ONCE
OUTPATIENT
Start: 2024-01-15 | End: 2024-01-15

## 2024-01-15 RX ORDER — BACITRACIN ZINC AND POLYMYXIN B SULFATE 500; 1000 [USP'U]/G; [USP'U]/G
OINTMENT TOPICAL ONCE
Status: CANCELLED | OUTPATIENT
Start: 2024-01-15 | End: 2024-01-15

## 2024-01-15 RX ORDER — SODIUM CHLOR/HYPOCHLOROUS ACID 0.033 %
SOLUTION, IRRIGATION IRRIGATION ONCE
Status: CANCELLED | OUTPATIENT
Start: 2024-01-15 | End: 2024-01-15

## 2024-01-15 RX ORDER — LIDOCAINE HYDROCHLORIDE 20 MG/ML
JELLY TOPICAL ONCE
OUTPATIENT
Start: 2024-01-15 | End: 2024-01-15

## 2024-01-15 RX ORDER — IBUPROFEN 200 MG
TABLET ORAL ONCE
Status: CANCELLED | OUTPATIENT
Start: 2024-01-15 | End: 2024-01-15

## 2024-01-15 RX ORDER — LIDOCAINE HYDROCHLORIDE 40 MG/ML
SOLUTION TOPICAL ONCE
OUTPATIENT
Start: 2024-01-15 | End: 2024-01-15

## 2024-01-15 RX ORDER — BETAMETHASONE DIPROPIONATE 0.5 MG/G
CREAM TOPICAL ONCE
Status: CANCELLED | OUTPATIENT
Start: 2024-01-15 | End: 2024-01-15

## 2024-01-15 RX ORDER — LIDOCAINE 40 MG/G
CREAM TOPICAL ONCE
Status: CANCELLED | OUTPATIENT
Start: 2024-01-15 | End: 2024-01-15

## 2024-01-15 RX ORDER — GINSENG 100 MG
CAPSULE ORAL ONCE
Status: CANCELLED | OUTPATIENT
Start: 2024-01-15 | End: 2024-01-15

## 2024-01-15 RX ORDER — GENTAMICIN SULFATE 1 MG/G
OINTMENT TOPICAL ONCE
Status: CANCELLED | OUTPATIENT
Start: 2024-01-15 | End: 2024-01-15

## 2024-01-15 RX ORDER — TRIAMCINOLONE ACETONIDE 1 MG/G
OINTMENT TOPICAL ONCE
OUTPATIENT
Start: 2024-01-15 | End: 2024-01-15

## 2024-01-15 RX ORDER — LIDOCAINE HYDROCHLORIDE 20 MG/ML
JELLY TOPICAL ONCE
Status: CANCELLED | OUTPATIENT
Start: 2024-01-15 | End: 2024-01-15

## 2024-01-15 RX ORDER — BETAMETHASONE DIPROPIONATE 0.5 MG/G
CREAM TOPICAL ONCE
OUTPATIENT
Start: 2024-01-15 | End: 2024-01-15

## 2024-01-15 RX ORDER — SODIUM CHLOR/HYPOCHLOROUS ACID 0.033 %
SOLUTION, IRRIGATION IRRIGATION ONCE
OUTPATIENT
Start: 2024-01-15 | End: 2024-01-15

## 2024-01-15 RX ORDER — IBUPROFEN 200 MG
TABLET ORAL ONCE
OUTPATIENT
Start: 2024-01-15 | End: 2024-01-15

## 2024-01-15 RX ORDER — LIDOCAINE 50 MG/G
OINTMENT TOPICAL ONCE
OUTPATIENT
Start: 2024-01-15 | End: 2024-01-15

## 2024-01-15 RX ORDER — GENTAMICIN SULFATE 1 MG/G
OINTMENT TOPICAL ONCE
OUTPATIENT
Start: 2024-01-15 | End: 2024-01-15

## 2024-01-15 RX ORDER — BACITRACIN ZINC AND POLYMYXIN B SULFATE 500; 1000 [USP'U]/G; [USP'U]/G
OINTMENT TOPICAL ONCE
OUTPATIENT
Start: 2024-01-15 | End: 2024-01-15

## 2024-01-15 RX ORDER — LIDOCAINE 40 MG/G
CREAM TOPICAL ONCE
OUTPATIENT
Start: 2024-01-15 | End: 2024-01-15

## 2024-01-15 RX ORDER — LIDOCAINE HYDROCHLORIDE 40 MG/ML
SOLUTION TOPICAL ONCE
Status: CANCELLED | OUTPATIENT
Start: 2024-01-15 | End: 2024-01-15

## 2024-01-15 RX ORDER — TRIAMCINOLONE ACETONIDE 1 MG/G
OINTMENT TOPICAL ONCE
Status: CANCELLED | OUTPATIENT
Start: 2024-01-15 | End: 2024-01-15

## 2024-01-15 ASSESSMENT — PAIN DESCRIPTION - LOCATION: LOCATION: ARM

## 2024-01-15 ASSESSMENT — PAIN DESCRIPTION - DESCRIPTORS: DESCRIPTORS: SHARP;ACHING

## 2024-01-15 ASSESSMENT — PAIN DESCRIPTION - ORIENTATION: ORIENTATION: LEFT;LOWER

## 2024-01-15 ASSESSMENT — PAIN SCALES - GENERAL: PAINLEVEL_OUTOF10: 3

## 2024-01-15 NOTE — PROGRESS NOTES
Wound Center  Progress Note / Procedure Note      Chief Complaint:  Red Paredes is a 88 y.o.  male  with left forearm wound of >1 weeks duration.          Assessment/Plan     88 y.o. male with Agent orange, cancer    -left forearm chronic ulcer.  Skin tear incurred on 1/6 s/p fall- FOOSH  healthy    Use collagen alginate  Leave on all week  Do not get wet, do not remove      Follow up with me in 1 week    Subjective:       HPI:     Tripped on 1/6, used left hand to prevent fall  Resulting left ulnar fracture and skin tear  Using xeroform      History/Chart/Medications reviewed    Wound caused by:  trauma  Current wound care:See flowsheet    Appetite: fair  Wound associated pain: See flowsheet  Diabetic: no  Smoker: no  ROS: no N/V/D, no T/chills;       Objective:     Physical Exam:   See flowsheet / nursing notes for vitals  Vitals:    01/15/24 1058   BP: (!) 148/79   Pulse: 95   Resp: 16   Temp: 97.6 °F (36.4 °C)   SpO2: 100%     General: NAD. Hygiene good  Psych: cooperative. No anxiety or depression. Normal mood and affect.  Neuro: alert and oriented to person/place/situation. Otherwise nonfocal.  Derm: Decreased turgor for age, dry skin  HEENT: Normocephalic, atraumatic. EOMI. Conjunctiva clear. No scleral icterus.  Neck: Normal range of motion.   Chest: Respirations nonlabored      Ulcer Description:   See Flowsheet           Data Review:              Procedure:   N/a        -------  Wound 01/15/24 Arm Left;Lower;Posterior skin tear (Active)   Wound Image   01/15/24 1055   Wound Etiology Traumatic 01/15/24 1055   Dressing Status New drainage noted;New dressing applied 01/15/24 1055   Wound Cleansed Wound cleanser;Vashe 01/15/24 1055   Wound Length (cm) 5.5 cm 01/15/24 1055   Wound Width (cm) 4.5 cm 01/15/24 1055   Wound Depth (cm) 0.1 cm 01/15/24 1055   Wound Surface Area (cm^2) 24.75 cm^2 01/15/24 1055   Wound Volume (cm^3) 2.475 cm^3 01/15/24 1055   Wound Assessment Devitalized tissue;Pink/red

## 2024-01-15 NOTE — FLOWSHEET NOTE
01/15/24 1055   Wound 01/15/24 Arm Left;Lower;Posterior skin tear   Date First Assessed/Time First Assessed: 01/15/24 1055   Present on Original Admission: Yes  Primary Wound Type: Traumatic  Location: Arm  Wound Location Orientation: Left;Lower;Posterior  Wound Description (Comments): skin tear   Wound Image    Wound Etiology Traumatic   Dressing Status New drainage noted;New dressing applied   Wound Cleansed Wound cleanser;Vashe   Dressing/Treatment Collagen;Alginate with Ag;Gauze dressing/dressing sponge;Non adherent;Roll gauze;Splint   Offloading for Diabetic Foot Ulcers Offloading not required   Dressing Change Due 01/22/24   Wound Length (cm) 5.5 cm   Wound Width (cm) 4.5 cm   Wound Depth (cm) 0.1 cm   Wound Surface Area (cm^2) 24.75 cm^2   Wound Volume (cm^3) 2.475 cm^3   Wound Assessment Devitalized tissue;Pink/red   Drainage Amount Small (< 25%)   Drainage Description Serosanguinous   Odor None   Kyra-wound Assessment Blanchable erythema;Edematous   Margins Flat/open edges   Wound Thickness Description not for Pressure Injury Full thickness

## 2024-01-22 ENCOUNTER — HOSPITAL ENCOUNTER (OUTPATIENT)
Facility: HOSPITAL | Age: 89
Discharge: HOME | End: 2024-01-22
Attending: FAMILY MEDICINE
Payer: MEDICARE

## 2024-01-22 VITALS
RESPIRATION RATE: 18 BRPM | OXYGEN SATURATION: 100 % | TEMPERATURE: 99.1 F | HEART RATE: 95 BPM | DIASTOLIC BLOOD PRESSURE: 81 MMHG | SYSTOLIC BLOOD PRESSURE: 142 MMHG

## 2024-01-22 PROCEDURE — 99213 OFFICE O/P EST LOW 20 MIN: CPT

## 2024-01-22 ASSESSMENT — PAIN SCALES - GENERAL: PAINLEVEL_OUTOF10: 5

## 2024-01-22 NOTE — DISCHARGE INSTRUCTIONS
Discharge Instructions from  [facility]  [address] [address] [phone] Fax [phone]    Do not remove dressing     Return Appointment:  [] Wound and dressing supply provider:   [] ECF or Home Healthcare:  [] Wound Assessment: [] Physician or NP scheduled for Wound Assessment:   [x] Return Appointment: With MD  in  1 Week(s)  [] Ordered tests:       Wound Care Center Information: Should you experience any significant changes in your wound(s) or have questions about your wound care, please contact the [facility] at MONDAY - FRIDAY 8:00 am - 4:30.  If you need help with your wound outside these hours and cannot wait until we are again available, contact your PCP or go to the hospital emergency room.     PLEASE NOTE: IF YOU ARE UNABLE TO OBTAIN WOUND SUPPLIES, CONTINUE TO USE THE SUPPLIES YOU HAVE AVAILABLE UNTIL YOU ARE ABLE TO REACH US. IT IS MOST IMPORTANT TO KEEP THE WOUND COVERED AT ALL TIMES.

## 2024-01-22 NOTE — FLOWSHEET NOTE
01/22/24 1041   Wound 01/15/24 Arm Left;Lower;Posterior skin tear   Date First Assessed/Time First Assessed: 01/15/24 1055   Present on Original Admission: Yes  Primary Wound Type: Traumatic  Location: Arm  Wound Location Orientation: Left;Lower;Posterior  Wound Description (Comments): skin tear   Wound Image    Wound Length (cm)   (clinically closed)   Wound Assessment Epithelialization   Drainage Amount None (dry)   Odor None   Kyra-wound Assessment Intact   Margins Attached edges

## 2024-01-22 NOTE — PROGRESS NOTES
metoprolol succinate (TOPROL XL) 25 MG extended release tablet Take 1 tablet by mouth daily 12/29/21   Automatic Reconciliation, Ar   pyridoxine (B-6) 100 MG tablet Take 1 tablet by mouth daily    Automatic Reconciliation, Ar   RABEprazole (ACIPHEX) 20 MG tablet Take 1 tablet by mouth daily    Automatic Reconciliation, Ar   SODIUM FLUORIDE, DENTAL GEL, 1.1 % GEL ceived the following from [facility]: Outside name: PreviDent 5000 Plus 1.1 % crea 5/25/22   Automatic Reconciliation, Ar   vitamin E 45 MG (100 UNIT) capsule Take 4 capsules by mouth daily    Automatic Reconciliation, Ar      No Known Allergies       Signed By: Quiana Hardy MD      01/22/24

## 2024-01-29 ENCOUNTER — HOSPITAL ENCOUNTER (OUTPATIENT)
Facility: HOSPITAL | Age: 89
Discharge: HOME | End: 2024-01-29
Attending: FAMILY MEDICINE
Payer: MEDICARE

## 2024-01-29 VITALS
DIASTOLIC BLOOD PRESSURE: 78 MMHG | TEMPERATURE: 98.5 F | RESPIRATION RATE: 16 BRPM | HEART RATE: 100 BPM | SYSTOLIC BLOOD PRESSURE: 132 MMHG

## 2024-01-29 DIAGNOSIS — S51.812A SKIN TEAR OF LEFT FOREARM WITHOUT COMPLICATION, INITIAL ENCOUNTER: Primary | ICD-10-CM

## 2024-01-29 PROCEDURE — 99211 OFF/OP EST MAY X REQ PHY/QHP: CPT

## 2024-01-29 RX ORDER — GINSENG 100 MG
CAPSULE ORAL ONCE
OUTPATIENT
Start: 2024-01-29 | End: 2024-01-29

## 2024-01-29 RX ORDER — LIDOCAINE 40 MG/G
CREAM TOPICAL ONCE
OUTPATIENT
Start: 2024-01-29 | End: 2024-01-29

## 2024-01-29 RX ORDER — CLOBETASOL PROPIONATE 0.5 MG/G
OINTMENT TOPICAL ONCE
OUTPATIENT
Start: 2024-01-29 | End: 2024-01-29

## 2024-01-29 RX ORDER — SODIUM CHLOR/HYPOCHLOROUS ACID 0.033 %
SOLUTION, IRRIGATION IRRIGATION ONCE
OUTPATIENT
Start: 2024-01-29 | End: 2024-01-29

## 2024-01-29 RX ORDER — IBUPROFEN 200 MG
TABLET ORAL ONCE
OUTPATIENT
Start: 2024-01-29 | End: 2024-01-29

## 2024-01-29 RX ORDER — LIDOCAINE HYDROCHLORIDE 20 MG/ML
JELLY TOPICAL ONCE
OUTPATIENT
Start: 2024-01-29 | End: 2024-01-29

## 2024-01-29 RX ORDER — GENTAMICIN SULFATE 1 MG/G
OINTMENT TOPICAL ONCE
OUTPATIENT
Start: 2024-01-29 | End: 2024-01-29

## 2024-01-29 RX ORDER — BETAMETHASONE DIPROPIONATE 0.5 MG/G
CREAM TOPICAL ONCE
OUTPATIENT
Start: 2024-01-29 | End: 2024-01-29

## 2024-01-29 RX ORDER — LIDOCAINE 50 MG/G
OINTMENT TOPICAL ONCE
OUTPATIENT
Start: 2024-01-29 | End: 2024-01-29

## 2024-01-29 RX ORDER — TRIAMCINOLONE ACETONIDE 1 MG/G
OINTMENT TOPICAL ONCE
OUTPATIENT
Start: 2024-01-29 | End: 2024-01-29

## 2024-01-29 RX ORDER — LIDOCAINE HYDROCHLORIDE 40 MG/ML
SOLUTION TOPICAL ONCE
OUTPATIENT
Start: 2024-01-29 | End: 2024-01-29

## 2024-01-29 RX ORDER — BACITRACIN ZINC AND POLYMYXIN B SULFATE 500; 1000 [USP'U]/G; [USP'U]/G
OINTMENT TOPICAL ONCE
OUTPATIENT
Start: 2024-01-29 | End: 2024-01-29

## 2024-01-29 NOTE — WOUND CARE
Wound Care Discharge Instructions  [facility]  [address] [address]                                   Telephone: [phone]      FAX [phone]    NAME:  Red Paredes  YOB: 1935  MEDICAL RECORD NUMBER:  [id number]  DATE:  1/29/2024    Congratulations!  You have completed your treatment.     Return to your Primary Care Physician for all your health issues.   Resume your ordinary activities as tolerated.   Take your medications as prescribed by your primary care physician.   Check your skin daily for cracks, bruises, sores, or dryness. Use a moisturizer as needed.   Clean and dry your skin, using mild soap and warm water (not hot).   Avoid alcohol and caffeine and do not smoke.  Maintain a nutritious diet.  Avoid pressure on your wound site. Keep your legs elevated above the level of the heart whenever possible.  Continue to use wraps/stockings/compression as prescribed.  Replace compression stockings every four to six months as needed to ensure proper fit.   Wear well-fitting shoes and leg garments.    THANK YOU FOR ALLOWING US TO SERVE YOU.  PLEASE CALL IF YOU DEVELOP ANOTHER WOUND. [phone])     Electronically signed by CORINNA PLASCENCIA LPN on 1/29/2024 at 11:38 AM

## 2024-01-29 NOTE — DISCHARGE INSTRUCTIONS
Wound Care Discharge Instructions  [facility]  [address] [address]                                   Telephone: [phone]      FAX [phone]    NAME:  Red Paredes  YOB: 1935  MEDICAL RECORD NUMBER:  [id number]  DATE:  @ED@    Congratulations!  You have completed your treatment.     Return to your Primary Care Physician for all your health issues.   Resume your ordinary activities as tolerated.   Take your medications as prescribed by your primary care physician.   Check your skin daily for cracks, bruises, sores, or dryness. Use a moisturizer as needed.   Clean and dry your skin, using mild soap and warm water (not hot).   Avoid alcohol and caffeine and do not smoke.  Maintain a nutritious diet.  Avoid pressure on your wound site. Keep your legs elevated above the level of the heart whenever possible.  Continue to use wraps/stockings/compression as prescribed.  Replace compression stockings every four to six months as needed to ensure proper fit.   Wear well-fitting shoes and leg garments.    THANK YOU FOR ALLOWING US TO SERVE YOU.  PLEASE CALL IF YOU DEVELOP ANOTHER WOUND. [phone])     Electronically signed by CORINNA PLASCENCIA LPN on 1/29/2024 at 11:33 AM

## 2024-01-30 NOTE — PROGRESS NOTES
Wound Center  Progress Note      Chief Complaint:  Red Paredes is a 88 y.o.  male  with left forearm wound of >1 weeks duration.          Assessment/Plan     88 y.o. male with Agent orange, cancer    -left forearm chronic ulcer.  Skin tear incurred on 1/6 s/p fall- FOOSH  Healed      Follow up prn    Subjective:   Since last visit  No new issues      HPI:     Tripped on 1/6, used left hand to prevent fall  Resulting left ulnar fracture and skin tear  Using xeroform      History/Chart/Medications reviewed    Wound caused by:  trauma  Current wound care:See flowsheet    Appetite: fair  Wound associated pain: See flowsheet  Diabetic: no  Smoker: no  ROS: no N/V/D, no T/chills;       Objective:     Physical Exam:   See flowsheet / nursing notes for vitals  Vitals:    01/29/24 1029   BP:    Pulse:    Resp:    Temp:    SpO2: (P) 100%     General: NAD. Hygiene good  Psych: cooperative. No anxiety or depression. Normal mood and affect.  Neuro: alert and oriented to person/place/situation. Otherwise nonfocal.  Derm: Decreased turgor for age, dry skin  HEENT: Normocephalic, atraumatic. EOMI. Conjunctiva clear. No scleral icterus.  Neck: Normal range of motion.   Chest: Respirations nonlabored      Ulcer Description:   See Flowsheet           Data Review:              Procedure:   N/a        -------      -------    Past Medical History:   Diagnosis Date    Arthritis     hip    Bladder cancer (HCC) 2021    BPH (benign prostatic hyperplasia)     Burning with urination     CAD (coronary artery disease)     Difficulty urinating     GERD (gastroesophageal reflux disease)     History of prostate cancer     Hyperlipidemia     Hypertension 2016    Incontinence     Nocturia     Prostate CA (HCC) 2009    Screening for prostate cancer     Stress incontinence of urine     Urge incontinence of urine      Past Surgical History:   Procedure Laterality Date    CATARACT REMOVAL  2010    BILAT    EYE SERVICE OR PROCEDURE  1/2012

## 2025-05-06 NOTE — PERIOP NOTES

## (undated) DEVICE — SOLUTION IRRIG 3000ML H2O STRL BAG

## (undated) DEVICE — BAG URIN LEG DISPOZ-A-BG 19OZ -- W/18IN EXT TUBING

## (undated) DEVICE — GLOVE ORANGE PI 8 1/2   MSG9085

## (undated) DEVICE — SYSTEM SKIN CLSR 22CM DERMBND PRINEO

## (undated) DEVICE — GARMENT,MEDLINE,DVT,INT,CALF,MED, GEN2: Brand: MEDLINE

## (undated) DEVICE — OPTIFOAM GENTLE SA, POSTOP, 4X8: Brand: MEDLINE

## (undated) DEVICE — CYSTO PACK: Brand: MEDLINE INDUSTRIES, INC.

## (undated) DEVICE — SUT VCRL + 2-0 36IN CT1 UD --

## (undated) DEVICE — 8" EXT SET W/CLAVE™, 2 GRADUATED CONNECTORS, CLAMP: Brand: ICU MEDICAL

## (undated) DEVICE — REM POLYHESIVE ADULT PATIENT RETURN ELECTRODE: Brand: VALLEYLAB

## (undated) DEVICE — CATH URETH FOL 2W MED 22FRX5 --

## (undated) DEVICE — GOWN,SIRUS,NONRNF,SETINSLV,2XL,18/CS: Brand: MEDLINE

## (undated) DEVICE — SOLUTION IRRIG 3000ML LAC R FLX CONT

## (undated) DEVICE — SUT MONOCRYL PLUS UD 3-0 --

## (undated) DEVICE — GLOVE SURG SZ 85 L12IN FNGR THK79MIL GRN LTX FREE

## (undated) DEVICE — 3M™ IOBAN™ 2 ANTIMICROBIAL INCISE DRAPE 6650EZ: Brand: IOBAN™ 2

## (undated) DEVICE — Device

## (undated) DEVICE — MARKER,SKIN,WI/RULER AND LABELS: Brand: MEDLINE

## (undated) DEVICE — NDL SPNE QNCKE 18GX3.5IN LF --

## (undated) DEVICE — OSCILLATING TIP SAW CARTRIDGE: Brand: PRECISION FALCON

## (undated) DEVICE — STRAP,POSITIONING,KNEE/BODY,FOAM,4X60": Brand: MEDLINE

## (undated) DEVICE — SUT VCRL + 1 36IN CT1 VIO --

## (undated) DEVICE — BASIC SINGLE BASIN 1-LF: Brand: MEDLINE INDUSTRIES, INC.

## (undated) DEVICE — SOL IRRIGATION INJ NACL 0.9% 500ML BTL

## (undated) DEVICE — SOL IRR STRL H2O 1500ML BTL --

## (undated) DEVICE — STERILE LATEX POWDER-FREE SURGICAL GLOVESWITH NITRILE COATING: Brand: PROTEXIS

## (undated) DEVICE — PACK PROCEDURE SURG ANTR HIP